# Patient Record
Sex: MALE | Race: BLACK OR AFRICAN AMERICAN | NOT HISPANIC OR LATINO | URBAN - METROPOLITAN AREA
[De-identification: names, ages, dates, MRNs, and addresses within clinical notes are randomized per-mention and may not be internally consistent; named-entity substitution may affect disease eponyms.]

---

## 2017-11-06 ENCOUNTER — OUTPATIENT (OUTPATIENT)
Dept: OUTPATIENT SERVICES | Facility: HOSPITAL | Age: 27
LOS: 1 days | Discharge: STILL ADMITTED OR ADMINISTRATIVE | End: 2017-11-06

## 2017-11-06 LAB
BASOPHILS # BLD AUTO: 0.03 K/UL — SIGNIFICANT CHANGE UP (ref 0–0.2)
BASOPHILS NFR BLD AUTO: 0.4 % — SIGNIFICANT CHANGE UP (ref 0–2)
EOSINOPHIL # BLD AUTO: 0.04 K/UL — SIGNIFICANT CHANGE UP (ref 0–0.5)
EOSINOPHIL NFR BLD AUTO: 0.5 % — SIGNIFICANT CHANGE UP (ref 0–6)
HCT VFR BLD CALC: 45.3 % — SIGNIFICANT CHANGE UP (ref 39–50)
HGB BLD-MCNC: 14.2 G/DL — SIGNIFICANT CHANGE UP (ref 13–17)
IMM GRANULOCYTES # BLD AUTO: 0.04 # — SIGNIFICANT CHANGE UP
IMM GRANULOCYTES NFR BLD AUTO: 0.5 % — SIGNIFICANT CHANGE UP (ref 0–1.5)
LYMPHOCYTES # BLD AUTO: 2.34 K/UL — SIGNIFICANT CHANGE UP (ref 1–3.3)
LYMPHOCYTES # BLD AUTO: 29.5 % — SIGNIFICANT CHANGE UP (ref 13–44)
MCHC RBC-ENTMCNC: 26.4 PG — LOW (ref 27–34)
MCHC RBC-ENTMCNC: 31.3 % — LOW (ref 32–36)
MCV RBC AUTO: 84.2 FL — SIGNIFICANT CHANGE UP (ref 80–100)
MONOCYTES # BLD AUTO: 0.81 K/UL — SIGNIFICANT CHANGE UP (ref 0–0.9)
MONOCYTES NFR BLD AUTO: 10.2 % — SIGNIFICANT CHANGE UP (ref 2–14)
NEUTROPHILS # BLD AUTO: 4.68 K/UL — SIGNIFICANT CHANGE UP (ref 1.8–7.4)
NEUTROPHILS NFR BLD AUTO: 58.9 % — SIGNIFICANT CHANGE UP (ref 43–77)
NRBC # FLD: 0 — SIGNIFICANT CHANGE UP
PLATELET # BLD AUTO: 226 K/UL — SIGNIFICANT CHANGE UP (ref 150–400)
PMV BLD: 11.9 FL — SIGNIFICANT CHANGE UP (ref 7–13)
RBC # BLD: 5.38 M/UL — SIGNIFICANT CHANGE UP (ref 4.2–5.8)
RBC # FLD: 14.9 % — HIGH (ref 10.3–14.5)
WBC # BLD: 7.94 K/UL — SIGNIFICANT CHANGE UP (ref 3.8–10.5)
WBC # FLD AUTO: 7.94 K/UL — SIGNIFICANT CHANGE UP (ref 3.8–10.5)

## 2017-11-07 DIAGNOSIS — F25.9 SCHIZOAFFECTIVE DISORDER, UNSPECIFIED: ICD-10-CM

## 2017-11-08 ENCOUNTER — OUTPATIENT (OUTPATIENT)
Dept: OUTPATIENT SERVICES | Facility: HOSPITAL | Age: 27
LOS: 1 days | Discharge: HOME | End: 2017-11-08
Payer: COMMERCIAL

## 2017-11-10 LAB — CLOZAPINE SERPL-MCNC: SIGNIFICANT CHANGE UP

## 2017-11-15 ENCOUNTER — TRANSCRIPTION ENCOUNTER (OUTPATIENT)
Age: 27
End: 2017-11-15

## 2017-12-01 ENCOUNTER — OUTPATIENT (OUTPATIENT)
Dept: OUTPATIENT SERVICES | Facility: HOSPITAL | Age: 27
LOS: 1 days | End: 2017-12-01
Payer: MEDICAID

## 2017-12-01 PROCEDURE — G9001: CPT

## 2017-12-18 ENCOUNTER — APPOINTMENT (OUTPATIENT)
Dept: NEUROLOGY | Facility: HOSPITAL | Age: 27
End: 2017-12-18

## 2017-12-18 ENCOUNTER — OUTPATIENT (OUTPATIENT)
Dept: OUTPATIENT SERVICES | Facility: HOSPITAL | Age: 27
LOS: 1 days | End: 2017-12-18

## 2017-12-18 VITALS
HEART RATE: 104 BPM | HEIGHT: 69 IN | DIASTOLIC BLOOD PRESSURE: 96 MMHG | WEIGHT: 167 LBS | BODY MASS INDEX: 24.73 KG/M2 | SYSTOLIC BLOOD PRESSURE: 128 MMHG

## 2017-12-18 DIAGNOSIS — F32.9 MAJOR DEPRESSIVE DISORDER, SINGLE EPISODE, UNSPECIFIED: ICD-10-CM

## 2017-12-18 DIAGNOSIS — F25.1 SCHIZOAFFECTIVE DISORDER, DEPRESSIVE TYPE: ICD-10-CM

## 2017-12-18 DIAGNOSIS — F39 UNSPECIFIED MOOD [AFFECTIVE] DISORDER: ICD-10-CM

## 2017-12-18 DIAGNOSIS — F20.1 DISORGANIZED SCHIZOPHRENIA: ICD-10-CM

## 2017-12-18 DIAGNOSIS — F20.9 SCHIZOPHRENIA, UNSPECIFIED: ICD-10-CM

## 2017-12-18 DIAGNOSIS — R44.3 HALLUCINATIONS, UNSPECIFIED: ICD-10-CM

## 2017-12-18 DIAGNOSIS — Z86.59 PERSONAL HISTORY OF OTHER MENTAL AND BEHAVIORAL DISORDERS: ICD-10-CM

## 2017-12-18 DIAGNOSIS — R45.851 MAJOR DEPRESSIVE DISORDER, SINGLE EPISODE, UNSPECIFIED: ICD-10-CM

## 2017-12-18 DIAGNOSIS — R56.9 UNSPECIFIED CONVULSIONS: ICD-10-CM

## 2017-12-18 DIAGNOSIS — Z78.9 OTHER SPECIFIED HEALTH STATUS: ICD-10-CM

## 2017-12-18 DIAGNOSIS — Z87.898 PERSONAL HISTORY OF OTHER SPECIFIED CONDITIONS: ICD-10-CM

## 2017-12-18 DIAGNOSIS — Z91.5 PERSONAL HISTORY OF SELF-HARM: ICD-10-CM

## 2017-12-18 RX ORDER — CLOZAPINE 100 MG/1
100 TABLET ORAL
Refills: 0 | Status: ACTIVE | COMMUNITY

## 2017-12-18 RX ORDER — GABAPENTIN 600 MG/1
600 TABLET, COATED ORAL TWICE DAILY
Refills: 0 | Status: ACTIVE | COMMUNITY

## 2017-12-29 DIAGNOSIS — R69 ILLNESS, UNSPECIFIED: ICD-10-CM

## 2018-01-09 DIAGNOSIS — F25.9 SCHIZOAFFECTIVE DISORDER, UNSPECIFIED: ICD-10-CM

## 2018-01-29 ENCOUNTER — INPATIENT (INPATIENT)
Facility: HOSPITAL | Age: 28
LOS: 2 days | Discharge: HOME | DRG: 101 | End: 2018-02-01
Attending: PSYCHIATRY & NEUROLOGY | Admitting: PSYCHIATRY & NEUROLOGY
Payer: COMMERCIAL

## 2018-01-29 VITALS
SYSTOLIC BLOOD PRESSURE: 122 MMHG | OXYGEN SATURATION: 100 % | DIASTOLIC BLOOD PRESSURE: 87 MMHG | TEMPERATURE: 98 F | HEART RATE: 112 BPM | RESPIRATION RATE: 16 BRPM

## 2018-01-29 DIAGNOSIS — G40.909 EPILEPSY, UNSPECIFIED, NOT INTRACTABLE, WITHOUT STATUS EPILEPTICUS: ICD-10-CM

## 2018-01-29 LAB
ALBUMIN SERPL ELPH-MCNC: 3.9 G/DL — SIGNIFICANT CHANGE UP (ref 3.3–5)
ALP SERPL-CCNC: 77 U/L — SIGNIFICANT CHANGE UP (ref 40–120)
ALT FLD-CCNC: 17 U/L — SIGNIFICANT CHANGE UP (ref 10–45)
ANION GAP SERPL CALC-SCNC: 11 MMOL/L — SIGNIFICANT CHANGE UP (ref 5–17)
AST SERPL-CCNC: 22 U/L — SIGNIFICANT CHANGE UP (ref 10–40)
BASOPHILS # BLD AUTO: 0.01 K/UL — SIGNIFICANT CHANGE UP (ref 0–0.2)
BASOPHILS NFR BLD AUTO: 0.1 % — SIGNIFICANT CHANGE UP (ref 0–2)
BILIRUB SERPL-MCNC: <0.2 MG/DL — SIGNIFICANT CHANGE UP (ref 0.2–1.2)
BUN SERPL-MCNC: 10 MG/DL — SIGNIFICANT CHANGE UP (ref 7–23)
CALCIUM SERPL-MCNC: 9 MG/DL — SIGNIFICANT CHANGE UP (ref 8.4–10.5)
CHLORIDE SERPL-SCNC: 104 MMOL/L — SIGNIFICANT CHANGE UP (ref 96–108)
CO2 SERPL-SCNC: 26 MMOL/L — SIGNIFICANT CHANGE UP (ref 22–31)
CREAT SERPL-MCNC: 1.02 MG/DL — SIGNIFICANT CHANGE UP (ref 0.5–1.3)
EOSINOPHIL # BLD AUTO: 0.04 K/UL — SIGNIFICANT CHANGE UP (ref 0–0.5)
EOSINOPHIL NFR BLD AUTO: 0.5 % — SIGNIFICANT CHANGE UP (ref 0–6)
GLUCOSE SERPL-MCNC: 84 MG/DL — SIGNIFICANT CHANGE UP (ref 70–99)
HCT VFR BLD CALC: 45.4 % — SIGNIFICANT CHANGE UP (ref 39–50)
HGB BLD-MCNC: 14.7 G/DL — SIGNIFICANT CHANGE UP (ref 13–17)
IMM GRANULOCYTES NFR BLD AUTO: 0.2 % — SIGNIFICANT CHANGE UP (ref 0–1.5)
LYMPHOCYTES # BLD AUTO: 2.27 K/UL — SIGNIFICANT CHANGE UP (ref 1–3.3)
LYMPHOCYTES # BLD AUTO: 27.3 % — SIGNIFICANT CHANGE UP (ref 13–44)
MCHC RBC-ENTMCNC: 27 PG — SIGNIFICANT CHANGE UP (ref 27–34)
MCHC RBC-ENTMCNC: 32.4 GM/DL — SIGNIFICANT CHANGE UP (ref 32–36)
MCV RBC AUTO: 83.3 FL — SIGNIFICANT CHANGE UP (ref 80–100)
MONOCYTES # BLD AUTO: 0.54 K/UL — SIGNIFICANT CHANGE UP (ref 0–0.9)
MONOCYTES NFR BLD AUTO: 6.5 % — SIGNIFICANT CHANGE UP (ref 2–14)
NEUTROPHILS # BLD AUTO: 5.45 K/UL — SIGNIFICANT CHANGE UP (ref 1.8–7.4)
NEUTROPHILS NFR BLD AUTO: 65.4 % — SIGNIFICANT CHANGE UP (ref 43–77)
PLATELET # BLD AUTO: 205 K/UL — SIGNIFICANT CHANGE UP (ref 150–400)
POTASSIUM SERPL-MCNC: 4.4 MMOL/L — SIGNIFICANT CHANGE UP (ref 3.5–5.3)
POTASSIUM SERPL-SCNC: 4.4 MMOL/L — SIGNIFICANT CHANGE UP (ref 3.5–5.3)
PROT SERPL-MCNC: 7.2 G/DL — SIGNIFICANT CHANGE UP (ref 6–8.3)
RBC # BLD: 5.45 M/UL — SIGNIFICANT CHANGE UP (ref 4.2–5.8)
RBC # FLD: 13.8 % — SIGNIFICANT CHANGE UP (ref 10.3–14.5)
SODIUM SERPL-SCNC: 141 MMOL/L — SIGNIFICANT CHANGE UP (ref 135–145)
VALPROATE SERPL-MCNC: 30 UG/ML — LOW (ref 50–100)
WBC # BLD: 8.33 K/UL — SIGNIFICANT CHANGE UP (ref 3.8–10.5)
WBC # FLD AUTO: 8.33 K/UL — SIGNIFICANT CHANGE UP (ref 3.8–10.5)

## 2018-01-29 PROCEDURE — 99223 1ST HOSP IP/OBS HIGH 75: CPT

## 2018-01-29 PROCEDURE — 95819 EEG AWAKE AND ASLEEP: CPT | Mod: 26

## 2018-01-29 RX ORDER — CLOZAPINE 150 MG/1
400 TABLET, ORALLY DISINTEGRATING ORAL AT BEDTIME
Qty: 0 | Refills: 0 | Status: DISCONTINUED | OUTPATIENT
Start: 2018-01-29 | End: 2018-02-01

## 2018-01-29 RX ORDER — GABAPENTIN 400 MG/1
600 CAPSULE ORAL THREE TIMES A DAY
Qty: 0 | Refills: 0 | Status: DISCONTINUED | OUTPATIENT
Start: 2018-01-29 | End: 2018-01-30

## 2018-01-29 RX ORDER — ENOXAPARIN SODIUM 100 MG/ML
40 INJECTION SUBCUTANEOUS EVERY 24 HOURS
Qty: 0 | Refills: 0 | Status: DISCONTINUED | OUTPATIENT
Start: 2018-01-29 | End: 2018-01-29

## 2018-01-29 RX ORDER — DIVALPROEX SODIUM 500 MG/1
500 TABLET, DELAYED RELEASE ORAL
Qty: 0 | Refills: 0 | Status: DISCONTINUED | OUTPATIENT
Start: 2018-01-29 | End: 2018-01-30

## 2018-01-29 RX ORDER — ENOXAPARIN SODIUM 100 MG/ML
40 INJECTION SUBCUTANEOUS EVERY 24 HOURS
Qty: 0 | Refills: 0 | Status: DISCONTINUED | OUTPATIENT
Start: 2018-01-29 | End: 2018-02-01

## 2018-01-29 RX ORDER — INFLUENZA VIRUS VACCINE 15; 15; 15; 15 UG/.5ML; UG/.5ML; UG/.5ML; UG/.5ML
0.5 SUSPENSION INTRAMUSCULAR ONCE
Qty: 0 | Refills: 0 | Status: DISCONTINUED | OUTPATIENT
Start: 2018-01-29 | End: 2018-02-01

## 2018-01-29 RX ADMIN — CLOZAPINE 400 MILLIGRAM(S): 150 TABLET, ORALLY DISINTEGRATING ORAL at 21:48

## 2018-01-29 RX ADMIN — ENOXAPARIN SODIUM 40 MILLIGRAM(S): 100 INJECTION SUBCUTANEOUS at 21:48

## 2018-01-29 RX ADMIN — GABAPENTIN 600 MILLIGRAM(S): 400 CAPSULE ORAL at 21:47

## 2018-01-29 RX ADMIN — DIVALPROEX SODIUM 500 MILLIGRAM(S): 500 TABLET, DELAYED RELEASE ORAL at 17:51

## 2018-01-29 RX ADMIN — GABAPENTIN 600 MILLIGRAM(S): 400 CAPSULE ORAL at 17:52

## 2018-01-29 NOTE — PATIENT PROFILE ADULT. - PRO MENTAL HEALTH SX RECENT
difficulty concentrating/feeling hopelessness/self-blame/attempt to harm/angry/feeling depressed/homicidal ideation/suicidal ideation

## 2018-01-29 NOTE — H&P ADULT - HISTORY OF PRESENT ILLNESS
26 y/o man with hx of depression, psychosis, hallucinations here on referral from his psychiatrist Dr. Lorna Mercado who would like him to get an EEG. Pt has had two prior seizures, 1st in 2011 and last 2 yrs ago which were in the setting of taking Clozapine. Pt does not remember these episodes in detail. Per mother he had an MRI and EEG at [facility] this summer , but does not have the results. They are unsure if it was a routine or prolonged study.     Pt reports depression, psychosis, disorganized thought, auditory and visual hallucinations of "very disturbing things". Also reports active suicidal ideation, has no plan currently. Has three prior suicide attempts in the past, last year was most recent, and were by wrist slitting and medication overdose. There are no weapons in the house and he has no access to any weapons. He current;y lives at home with his mother and attends a day programs during the week. He denies any headache, blurry vision, episodes of blanking out or limb shaking. He has no hx of head trauma or CNS infections. There may be some developmental delay. Pt attended undergraduate school but was unable to finish. 28 y/o man with hx of depression, psychosis, hallucinations here on referral from his psychiatrist Dr. Lorna Mercado who would like him to get an EEG. Pt has had two prior seizures, 1st in 2011 and last 2 yrs ago which were in the setting of taking Clozapine. Pt does not remember these episodes in detail. Per mother he had an MRI and EEG at [facility] this summer , but does not have the results. They are unsure if it was a routine or prolonged study.     Pt reports depression, psychosis, disorganized thought, auditory and visual hallucinations of "very disturbing things". Also reports active suicidal ideation, has no plan currently. Has three prior suicide attempts in the past, last year was most recent, and were by wrist slitting and medication overdose. There are no weapons in the house and he has no access to any weapons. He currently lives at home with his mother and attends a day programs during the week. He denies any headache, blurry vision, episodes of blanking out or limb shaking. He has no hx of head trauma or CNS infections. There may be some developmental delay. Pt attended undergraduate school but was unable to finish.   Visual hallucinations are consistent with seeing himself with facial injuries and scars. During the visit he states that he actively has visual hallucinations but not auditory. His auditory hallucinations are 3rd person most of the time, but some time the voices talk to him and command him. 28 y/o man with hx of depression, psychosis, hallucinations here on referral from his psychiatrist Dr. Lorna Mercado who would like him to get an EEG. Pt has had two prior seizures, 1st in 2011 and last 2 yrs ago which were in the setting of taking Clozapine. Pt does not remember these episodes in detail. Per mother he had an MRI and EEG at [facility] this summer , but does not have the results. They are unsure if it was a routine or prolonged study.     Pt reports depression, psychosis, disorganized thought, auditory and visual hallucinations of "very disturbing things". Also reports active suicidal ideation, has no plan currently. Has three prior suicide attempts in the past, last year was most recent, and were by wrist slitting and medication overdose. There are no weapons in the house and he has no access to any weapons. He currently lives at home with his mother and attends a day programs during the week. He denies any headache, blurry vision, episodes of blanking out or limb shaking. He has no hx of head trauma or CNS infections. There may be some developmental delay. Pt attended undergraduate school but was unable to finish.   Visual hallucinations are consistent with seeing himself with facial injuries and scars. During the visit he states that he actively has visual hallucinations but not auditory. His auditory hallucinations are 3rd person most of the time, but some time the voices talk to him and command him. He denies current suicidal ideation.

## 2018-01-29 NOTE — H&P ADULT - ATTENDING COMMENTS
28 yo male with long-standing history of schizoaffective disorder, possible personality disorder, followed at [facility] (Dr. Lorna Mercado), h/o suicide attempts (currently denying suicidal ideation), with ongoing visual and auditory hallucinations. Mr. Elliott had 2 reported likely GTCs during a hospitalization while Clozapine was increased to 600mg. Since then he was on AEDs; Initially Keppra, changed to VPA due to psych history, then Lamictal was added (stopped at some point for unclear reasons) and Gabapentin added (also unclear why). Per pt and his mom, no seizures since. He is admitted for monitoring if there is an underlying seizure disorder or seizures were provoked by increased dose clozapine.     Per discussion with Dr. Mercado today, the patient may not need to be on VPA and GBP for psychiatric reasons and it would be ok from an psychiatric standpoint to taper them off if there is no reason form him to take it from an epilepsy standpoint. We will taper GBP then VPA and monitor for epileptiform discharges and seizures, then decide if he will need to continue taking them.     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   EEG at [place] summer 2017 (not clear what kind, no results known).

## 2018-01-29 NOTE — H&P ADULT - NSHPPHYSICALEXAM_GEN_ALL_CORE
Neurological Exam:  Mental Status: Orientated to self, date and place.  Attention intact.  No dysarthria, aphasia or neglect.    Cranial Nerves: CN I - not tested.  PERRL, EOMI, VFF, no nystagmus or diplopia.  No APD. CN V1-3 intact to light touch and pinprick.      Motor:   Tone: normal.                  Strength:     [] Upper extremity                      Delt       Bicep    Tricep                                                  R         5/5 5/5 5/5 5/5                                               L          5/5 5/5 5/5 5/5  [] Lower extremity                       HF          KE          KF        DF         PF                                               R        5/5 5/5 5/5 5/5 5/5                                               L         5/5 5/5 5/5 5/5 5/5  Pronator drift: none                 No truncal ataxia.    Tremor: No resting, postural or action tremor.  No myoclonus.    Deep Tendon Reflexes: 1+ bilateral biceps, triceps, brachioradialis, knee and ankle  Toes flexor bilaterally    Gait: normal and stable.

## 2018-01-29 NOTE — H&P ADULT - NSHPREVIEWOFSYSTEMS_GEN_ALL_CORE
ROS:  HENT: No headache, no eye pain or discharge, no ear pain or discharge  Chest: No chest pain, no palpitation  Abdomen: No constipation, no diarrhea, no abdominal pain  EXT: No scars, no pain, no tenderness

## 2018-01-29 NOTE — H&P ADULT - ASSESSMENT
28 yo aam, with PMHx of schizophrenia,  pericranii venous anomaly for which he need MRI every 6 mths, last one done in jan 2013, which was stable, personality disorder from when he was 18, on clozapine,  presented to be monitored for seizure activity. He has 2 episodes of seizure at [facility] when they were increasing the Clozapine dose, no seizures recently. Seizures were described as GTCS (Entire body shaking, LOC, no head turn or forced gaze mentioned in the notes, postictal +, happened in the hospital and family wasn't there at that moment) . His schizophrenia symptoms are consistent with Visual hallucinations are consistent with seeing himself with facial injuries and scars. During the visit he states that he actively has visual hallucinations but not auditory. His auditory hallucinations are 3rd person most of the time, but some time the voices talk to him and command him.  Home meds: Gabapentin 600 TID, Clozapine 400 daily, Divalproex 500 BID  Plan:  [] C/W Clozapine  [] CBC, Diff, VPA level  [] Will D/W epileptologist regarding to AEDs  [] Seizure precautions  [] 2 mg Ativan for seizures > 3 mins, >2seizrues in 1 hour and > 3 seizures in 24h 28 yo aam, with PMHx of schizophrenia,  pericranii venous anomaly for which he need MRI every 6 mths, last one done in jan 2013, which was stable, personality disorder from when he was 18, on clozapine,  presented to be monitored for seizure activity. He has 2 episodes of seizure at [facility] when they were increasing the Clozapine dose, no seizures recently. Seizures were described as GTCS (Entire body shaking, LOC, no head turn or forced gaze mentioned in the notes, postictal +, happened in the hospital and family wasn't there at that moment) . His schizophrenia symptoms are consistent with Visual hallucinations are consistent with seeing himself with facial injuries and scars. During the visit he states that he actively has visual hallucinations but not auditory. His auditory hallucinations are 3rd person most of the time, but some time the voices talk to him and command him.  Home meds: Gabapentin 600 TID, Clozapine 400 daily, Divalproex 500 BID (denies taking Klonopin)  Plan:  [] C/W Clozapine  [] CBC, Diff, VPA level  [] Seizure precautions  [] 2 mg Ativan for seizures > 3 mins, >2seizrues in 1 hour and > 3 seizures in 24h

## 2018-01-30 ENCOUNTER — MOBILE ON CALL (OUTPATIENT)
Age: 28
End: 2018-01-30

## 2018-01-30 LAB
BASOPHILS # BLD AUTO: 0.01 K/UL — SIGNIFICANT CHANGE UP (ref 0–0.2)
BASOPHILS NFR BLD AUTO: 0.1 % — SIGNIFICANT CHANGE UP (ref 0–2)
EOSINOPHIL # BLD AUTO: 0.12 K/UL — SIGNIFICANT CHANGE UP (ref 0–0.5)
EOSINOPHIL NFR BLD AUTO: 1.5 % — SIGNIFICANT CHANGE UP (ref 0–6)
HCT VFR BLD CALC: 44.2 % — SIGNIFICANT CHANGE UP (ref 39–50)
HGB BLD-MCNC: 14.6 G/DL — SIGNIFICANT CHANGE UP (ref 13–17)
IMM GRANULOCYTES NFR BLD AUTO: 0.3 % — SIGNIFICANT CHANGE UP (ref 0–1.5)
LYMPHOCYTES # BLD AUTO: 3.42 K/UL — HIGH (ref 1–3.3)
LYMPHOCYTES # BLD AUTO: 43.7 % — SIGNIFICANT CHANGE UP (ref 13–44)
MCHC RBC-ENTMCNC: 27.4 PG — SIGNIFICANT CHANGE UP (ref 27–34)
MCHC RBC-ENTMCNC: 33 GM/DL — SIGNIFICANT CHANGE UP (ref 32–36)
MCV RBC AUTO: 83.1 FL — SIGNIFICANT CHANGE UP (ref 80–100)
MONOCYTES # BLD AUTO: 0.75 K/UL — SIGNIFICANT CHANGE UP (ref 0–0.9)
MONOCYTES NFR BLD AUTO: 9.6 % — SIGNIFICANT CHANGE UP (ref 2–14)
NEUTROPHILS # BLD AUTO: 3.51 K/UL — SIGNIFICANT CHANGE UP (ref 1.8–7.4)
NEUTROPHILS NFR BLD AUTO: 44.8 % — SIGNIFICANT CHANGE UP (ref 43–77)
PLATELET # BLD AUTO: 218 K/UL — SIGNIFICANT CHANGE UP (ref 150–400)
RBC # BLD: 5.32 M/UL — SIGNIFICANT CHANGE UP (ref 4.2–5.8)
RBC # FLD: 14 % — SIGNIFICANT CHANGE UP (ref 10.3–14.5)
WBC # BLD: 7.83 K/UL — SIGNIFICANT CHANGE UP (ref 3.8–10.5)
WBC # FLD AUTO: 7.83 K/UL — SIGNIFICANT CHANGE UP (ref 3.8–10.5)

## 2018-01-30 PROCEDURE — 99232 SBSQ HOSP IP/OBS MODERATE 35: CPT

## 2018-01-30 PROCEDURE — 95951: CPT | Mod: 26

## 2018-01-30 RX ORDER — DIVALPROEX SODIUM 500 MG/1
250 TABLET, DELAYED RELEASE ORAL
Qty: 0 | Refills: 0 | Status: DISCONTINUED | OUTPATIENT
Start: 2018-01-31 | End: 2018-01-31

## 2018-01-30 RX ORDER — DIVALPROEX SODIUM 500 MG/1
500 TABLET, DELAYED RELEASE ORAL ONCE
Qty: 0 | Refills: 0 | Status: COMPLETED | OUTPATIENT
Start: 2018-01-30 | End: 2018-01-30

## 2018-01-30 RX ORDER — GABAPENTIN 400 MG/1
300 CAPSULE ORAL THREE TIMES A DAY
Qty: 0 | Refills: 0 | Status: DISCONTINUED | OUTPATIENT
Start: 2018-01-30 | End: 2018-01-30

## 2018-01-30 RX ORDER — GABAPENTIN 400 MG/1
300 CAPSULE ORAL ONCE
Qty: 0 | Refills: 0 | Status: DISCONTINUED | OUTPATIENT
Start: 2018-01-30 | End: 2018-01-30

## 2018-01-30 RX ADMIN — DIVALPROEX SODIUM 500 MILLIGRAM(S): 500 TABLET, DELAYED RELEASE ORAL at 16:59

## 2018-01-30 RX ADMIN — CLOZAPINE 400 MILLIGRAM(S): 150 TABLET, ORALLY DISINTEGRATING ORAL at 21:16

## 2018-01-30 RX ADMIN — DIVALPROEX SODIUM 500 MILLIGRAM(S): 500 TABLET, DELAYED RELEASE ORAL at 05:10

## 2018-01-30 RX ADMIN — GABAPENTIN 600 MILLIGRAM(S): 400 CAPSULE ORAL at 05:10

## 2018-01-30 RX ADMIN — ENOXAPARIN SODIUM 40 MILLIGRAM(S): 100 INJECTION SUBCUTANEOUS at 21:16

## 2018-01-30 NOTE — PROGRESS NOTE ADULT - SUBJECTIVE AND OBJECTIVE BOX
NEUROLOGY FOLLOW UP NOTE     Patient is a 27y old  Male who presents with a chief complaint of seizure monitoring     SUBJECTIVE, INTERVAL HISTORY: No events overnight, states he feels depressed     OBJECTIVE:   Vital Signs Last 24 Hrs  T(C): 36.6 (30 Jan 2018 07:39), Max: 37 (29 Jan 2018 23:23)  T(F): 97.8 (30 Jan 2018 07:39), Max: 98.6 (29 Jan 2018 23:23)  HR: 102 (30 Jan 2018 07:39) (98 - 112)  BP: 109/78 (30 Jan 2018 07:39) (109/78 - 122/87)  BP(mean): --  RR: 19 (30 Jan 2018 07:39) (16 - 19)  SpO2: 96% (30 Jan 2018 07:39) (96% - 100%)    01-29    141  |  104  |  10  ----------------------------<  84  4.4   |  26  |  1.02    Ca    9.0      29 Jan 2018 15:54    TPro  7.2  /  Alb  3.9  /  TBili  <0.2  /  DBili  x   /  AST  22  /  ALT  17  /  AlkPhos  77  01-29                          14.6   7.83  )-----------( 218      ( 30 Jan 2018 07:42 )             44.2       MEDICATIONS  (STANDING):  cloZAPine 400 milliGRAM(s) Oral at bedtime  diVALproex  milliGRAM(s) Oral two times a day  enoxaparin Injectable 40 milliGRAM(s) SubCutaneous every 24 hours  gabapentin 300 milliGRAM(s) Oral once  influenza   Vaccine 0.5 milliLiter(s) IntraMuscular once      GENERAL EXAM: No acute distress                   NEUROLOGICAL EXAM:  Mental Status: AAOx3, fluent speech, follows simple commands, able to name  Cranial Nerves: EOMI, PERRL, VFF, V1-V3 intact, facial symmetric, tongue midline  Motor: strength 5/5 throughout. No drift x4. Asterixis b/l UE   Sensation: Intact to LT throughout  Coordination: FTN intact b/l    RADIOLOGY:

## 2018-01-30 NOTE — PROGRESS NOTE ADULT - ASSESSMENT
26 yo M w/ significant psychiatric history, venous anomaly pericranially w/ stable MRIs presents for EMU monitoring. Had two prior episodes of seizures, described as GTCs.     Home meds: Gabapentin 600 TID, Clozapine 400 daily, Divalproex 500 BID      Plan:  [] C/W Clozapine, c/w Depakote given possible mood stabilizer   [] will d/w Psychiatry re Depakote and whether it was added for mood stabilizer or AEDs   []will give one dose of Gabapentin 300 mg in evening, then stop   [] Depakote level low (30)   [] Seizure precautions  [] c/w 1:1 observation given prior suicidal history and active thoughts of depression and self harm, although no plan 28 yo M w/ significant psychiatric history, venous anomaly pericranially w/ stable MRIs presents for EMU monitoring. Had two prior episodes of seizures, described as GTCs.     Home meds: Gabapentin 600 TID, Clozapine 400 daily, Divalproex 500 BID      Plan:  -C/W Clozapine   - d/w Psychiatry re Depakote for mood stabilizer, they are agreeable to discontinue this medication as he does not require it for psychiatric purposes   - will taper Depakote: will give 500 mg tonight, then 250 mg tomorrow x2 doses, then discontinue   - will discontinue Gabapentin   -Depakote level low (30)   -Seizure precautions  -c/w 1:1 observation given prior suicidal history and active thoughts of depression and self harm, although no plan

## 2018-01-31 ENCOUNTER — TRANSCRIPTION ENCOUNTER (OUTPATIENT)
Age: 28
End: 2018-01-31

## 2018-01-31 VITALS
RESPIRATION RATE: 18 BRPM | SYSTOLIC BLOOD PRESSURE: 130 MMHG | HEART RATE: 118 BPM | DIASTOLIC BLOOD PRESSURE: 88 MMHG | TEMPERATURE: 98 F | OXYGEN SATURATION: 97 %

## 2018-01-31 DIAGNOSIS — F25.1 SCHIZOAFFECTIVE DISORDER, DEPRESSIVE TYPE: ICD-10-CM

## 2018-01-31 PROCEDURE — 99233 SBSQ HOSP IP/OBS HIGH 50: CPT

## 2018-01-31 PROCEDURE — 95951: CPT | Mod: 26

## 2018-01-31 PROCEDURE — 99254 IP/OBS CNSLTJ NEW/EST MOD 60: CPT | Mod: GC

## 2018-01-31 RX ORDER — DIVALPROEX SODIUM 500 MG/1
1 TABLET, DELAYED RELEASE ORAL
Qty: 0 | Refills: 0 | COMMUNITY
Start: 2018-01-31

## 2018-01-31 RX ORDER — GABAPENTIN 400 MG/1
1 CAPSULE ORAL
Qty: 0 | Refills: 0 | COMMUNITY

## 2018-01-31 RX ORDER — DIVALPROEX SODIUM 500 MG/1
500 TABLET, DELAYED RELEASE ORAL DAILY
Qty: 0 | Refills: 0 | Status: DISCONTINUED | OUTPATIENT
Start: 2018-01-31 | End: 2018-02-01

## 2018-01-31 RX ORDER — DIVALPROEX SODIUM 500 MG/1
250 TABLET, DELAYED RELEASE ORAL ONCE
Qty: 0 | Refills: 0 | Status: COMPLETED | OUTPATIENT
Start: 2018-01-31 | End: 2018-01-31

## 2018-01-31 RX ADMIN — DIVALPROEX SODIUM 250 MILLIGRAM(S): 500 TABLET, DELAYED RELEASE ORAL at 05:03

## 2018-01-31 RX ADMIN — DIVALPROEX SODIUM 500 MILLIGRAM(S): 500 TABLET, DELAYED RELEASE ORAL at 17:19

## 2018-01-31 RX ADMIN — DIVALPROEX SODIUM 250 MILLIGRAM(S): 500 TABLET, DELAYED RELEASE ORAL at 11:54

## 2018-01-31 NOTE — DISCHARGE NOTE ADULT - PATIENT PORTAL LINK FT
“You can access the Follow[employer] Patient Portal, offered by [facility], by registering with the following website: [email or web address]”

## 2018-01-31 NOTE — BEHAVIORAL HEALTH ASSESSMENT NOTE - NSBHCHARTREVIEWLAB_PSY_A_CORE FT
01-29    141  |  104  |  10  ----------------------------<  84  4.4   |  26  |  1.02    Ca    9.0      29 Jan 2018 15:54    TPro  7.2  /  Alb  3.9  /  TBili  <0.2  /  DBili  x   /  AST  22  /  ALT  17  /  AlkPhos  77  01-29  LIVER FUNCTIONS - ( 29 Jan 2018 15:54 )  Alb: 3.9 g/dL / Pro: 7.2 g/dL / ALK PHOS: 77 U/L / ALT: 17 U/L / AST: 22 U/L / GGT: x         CBC Full  -  ( 30 Jan 2018 07:42 )  WBC Count : 7.83 K/uL  Hemoglobin : 14.6 g/dL  Hematocrit : 44.2 %  Platelet Count - Automated : 218 K/uL  Mean Cell Volume : 83.1 fl  Mean Cell Hemoglobin : 27.4 pg  Mean Cell Hemoglobin Concentration : 33.0 gm/dL  Auto Neutrophil # : 3.51 K/uL  Auto Lymphocyte # : 3.42 K/uL  Auto Monocyte # : 0.75 K/uL  Auto Eosinophil # : 0.12 K/uL  Auto Basophil # : 0.01 K/uL  Auto Neutrophil % : 44.8 %  Auto Lymphocyte % : 43.7 %  Auto Monocyte % : 9.6 %  Auto Eosinophil % : 1.5 %  Auto Basophil % : 0.1 %

## 2018-01-31 NOTE — BEHAVIORAL HEALTH ASSESSMENT NOTE - HPI (INCLUDE ILLNESS QUALITY, SEVERITY, DURATION, TIMING, CONTEXT, MODIFYING FACTORS, ASSOCIATED SIGNS AND SYMPTOMS)
Patient is a 28 y/o Male, unemployed, single, domiciled with mother, with PMHx of Seizure 2/2 Clozaril, and PPHx of Schizoaffective disorder on Clozaril and Depakote, s/p ECT X22 treatments with little benefit, multiple prior psychiatric hospitalizations, last admission 1/16, history of passive suicidal ideations, distant history of self cutting behavior, no history of suicide attempts although pt reports overdosing on "some pills" which was not confirmed by mother or medical records, admitted for routine monitoring if there is an underlying seizure disorder or seizures were provoked by increased dose clozapine. Psychiatry consulted for suicidal ideations.    Pt found laying in bed, made poor eye contact, engaged in interview. When asked how he was doing, pt responded by stating "I don't want to live, these are passive thoughts, I don't have a plan." He reports passive SI for approximately 4 years, reports cutting himself and once taking an overdose but remains vague about this, unable to recall the medications taken, unable to recall how he was treated. 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 He denies feeling hopeless or helpless, denied other endogenous symptoms associated with depression. He denied feeling anxious, denies panic attacks, denies manic symptoms including but not limited to insomnia, distractibility, racing thoughts, denies history of abuse or trauma. He denies history of substance abuse.    Collateral history obtained from pt's out pt psychiatrist Dr. Mercado who reports pt is suicidal at baseline, with no plans or intentions. He also has visual hallucinations and auditory hallucinations at baseline. He is engaged in weekly group therapy and [facility] day program. He is currently being evaluated for autism spectrum disorder. Patient is a 26 y/o Male, unemployed, single, domiciled with mother, with PMHx of Seizure 2/2 Clozaril, and PPHx of Schizoaffective disorder on Clozaril and Depakote, s/p ECT X22 treatments with little benefit, multiple prior psychiatric hospitalizations, last admission 1/16, history of passive suicidal ideations, distant history of self cutting behavior, no history of suicide attempts although pt reports overdosing on "some pills" which was not confirmed by mother or medical records, admitted for routine monitoring if there is an underlying seizure disorder or seizures were provoked by increased dose clozapine. Psychiatry consulted for suicidal ideations.    Pt found laying in bed, made poor eye contact, engaged in interview. When asked how he was doing, pt responded by stating "I don't want to live, these are passive thoughts, I don't have a plan." He reports passive SI for approximately 4 years, reports cutting himself and once taking an overdose but remains vague about this, unable to recall the medications taken, unable to recall how he was treated. He also reports current active visual hallucinations, of "playing out certain things," such as "horror movies" and "cyborgs." He denied active AH, but does report having this approximately 1 y/a, non-command type, of a woman who asked him to like her. 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 He denies feeling hopeless or helpless, denied other endogenous symptoms associated with depression. He denied feeling anxious, denies panic attacks, denies manic symptoms including but not limited to insomnia, distractibility, racing thoughts, denies history of abuse or trauma. He denies history of substance abuse.    Collateral history obtained from pt's out pt psychiatrist Dr. Mercado who reports pt is suicidal at baseline, with no plans or intentions. He also has visual hallucinations and auditory hallucinations at baseline. He is engaged in weekly group therapy and [facility] day program. He is currently being evaluated for autism spectrum disorder.

## 2018-01-31 NOTE — BEHAVIORAL HEALTH ASSESSMENT NOTE - NSBHSUICPROTECTFACT_PSY_A_CORE
Identifies reasons for living/Supportive social network or family/Future oriented/Positive therapeutic relationships/Engaged in work or school

## 2018-01-31 NOTE — BEHAVIORAL HEALTH ASSESSMENT NOTE - NSBHCHARTREVIEWIMAGING_PSY_A_CORE FT
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 Otherwise no acute intracranial   abnormality.    Brain MRV: Multiple scalp veins draining into the superior sagittal sinus   via emissary vein compatible with sinus pericanii.  Vein of Mayo is not seen. 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    NICK NAYAK M.D., RADIOLOGY FELLOW  This document has been electronically signed.  ALBA HA M.D., ATTENDING RADIOLOGIST  This document has been electronicallysigned. Jan 29 2016  9:54A

## 2018-01-31 NOTE — BEHAVIORAL HEALTH ASSESSMENT NOTE - CASE SUMMARY
This is a 27-y.o. AAM pt. with PMHx of Seizure 2/2 Clozaril, and PPHx of Schizoaffective disorder on Clozaril and Depakote, s/p ECT X22 treatments with little benefit, multiple prior psychiatric hospitalizations (last in 2016), history of passive suicidal ideations, distant history of self cutting behavior, no history of suicide attempts although pt reports overdosing on "some pills", admitted for routine monitoring if there is an underlying seizure disorder or seizures were provoked by increased dose clozapine. Psychiatry consulted for suicidal ideations. Patient at his likely baseline.    I have seen and evaluated this patient myself. Chart, labs, meds reviewed. I agree with fellow's assessment and plan.

## 2018-01-31 NOTE — BEHAVIORAL HEALTH ASSESSMENT NOTE - NSBHCHARTREVIEWVS_PSY_A_CORE FT
Vital Signs Last 24 Hrs  T(C): 36.8 (31 Jan 2018 12:57), Max: 36.8 (30 Jan 2018 19:21)  T(F): 98.2 (31 Jan 2018 12:57), Max: 98.2 (30 Jan 2018 19:21)  HR: 104 (31 Jan 2018 12:57) (100 - 118)  BP: 122/85 (31 Jan 2018 12:57) (110/70 - 122/85)  BP(mean): --  RR: 20 (31 Jan 2018 12:57) (17 - 20)  SpO2: 96% (31 Jan 2018 12:57) (96% - 98%)

## 2018-01-31 NOTE — BEHAVIORAL HEALTH ASSESSMENT NOTE - NSBHCHARTREVIEWINVESTIGATE_PSY_A_CORE FT
1/22/18    Ventricular Rate 90 BPM    Atrial Rate 90 BPM    P-R Interval 152 ms    QRS Duration 90 ms     ms    QTc 420 ms    P Axis 57 degrees    R Axis 58 degrees    T Axis 37 degrees    Diagnosis Line Normal sinus rhythm  Normal ECG

## 2018-01-31 NOTE — DISCHARGE NOTE ADULT - PLAN OF CARE
monitoring continue taking Depakote 500 mg bid   stop taking Gabapentin 600 mg bid   continue taking rest of medications as per Psychiatrist please follow up with your Psychiatrist Dr Lorna Mercado on 2/21 at 1:30 PM at [facility]

## 2018-01-31 NOTE — PROGRESS NOTE ADULT - SUBJECTIVE AND OBJECTIVE BOX
NEUROLOGY FOLLOW UP NOTE     Patient is a 27y old  Male who presents with a chief complaint of seizure monitoring     SUBJECTIVE, INTERVAL HISTORY: No events overnight, states he feels depressed; says feels suicidal (no plan)    Vital Signs Last 24 Hrs  T(C): 36.4 (31 Jan 2018 09:18), Max: 36.8 (30 Jan 2018 19:21)  T(F): 97.6 (31 Jan 2018 09:18), Max: 98.2 (30 Jan 2018 19:21)  HR: 100 (31 Jan 2018 09:18) (100 - 118)  BP: 114/81 (31 Jan 2018 09:18) (110/70 - 122/85)  BP(mean): --  RR: 17 (31 Jan 2018 09:18) (17 - 18)  SpO2: 98% (31 Jan 2018 09:18) (96% - 98%)    MEDICATIONS  (STANDING):  cloZAPine 400 milliGRAM(s) Oral at bedtime  diVALproex  milliGRAM(s) Oral daily  enoxaparin Injectable 40 milliGRAM(s) SubCutaneous every 24 hours  influenza   Vaccine 0.5 milliLiter(s) IntraMuscular once    GENERAL EXAM: No acute distress                   NEUROLOGICAL EXAM:  Mental Status: AAOx3, fluent speech, follows simple commands, able to name  Cranial Nerves: EOMI, PERRL, VFF, V1-V3 intact, facial symmetric, tongue midline  Motor: strength 5/5 throughout. No drift x4. Asterixis b/l UE   Sensation: Intact to LT throughout  Coordination: FTN intact b/l    RADIOLOGY:

## 2018-01-31 NOTE — BEHAVIORAL HEALTH ASSESSMENT NOTE - PAST PSYCHOTROPIC MEDICATION
Saphris, Risperdal, Pristiq, Abilify, Haldol, Zyprexa, Prolixin, Haldol, Litihum, Lamictal, Celexa, Cymbalta, Wellbutrin XL, Ativan, Zolpidem, Cytomel, Atenolol

## 2018-01-31 NOTE — DISCHARGE NOTE ADULT - MEDICATION SUMMARY - MEDICATIONS TO TAKE
I will START or STAY ON the medications listed below when I get home from the hospital:    divalproex sodium 500 mg oral delayed release tablet  -- 1 tab(s) by mouth 2 times a day  -- Indication: For Seizures    cloZAPine 50 mg oral tablet  -- 5 tab(s) by mouth once a day (at bedtime)  -- Indication: For Psychiatric Disorder

## 2018-01-31 NOTE — BEHAVIORAL HEALTH ASSESSMENT NOTE - OTHER PAST PSYCHIATRIC HISTORY (INCLUDE DETAILS REGARDING ONSET, COURSE OF ILLNESS, INPATIENT/OUTPATIENT TREATMENT)
Pt was diagnosed with Schizoaffective d/o since age 16, previously dx with schizoid personality disorder, hospitalized at [facility] in 2014 and was transferred to [facility] in Feb.2014. He followed up with [facility], attended [facility], attended day program at [facility]. He has a hx of ECT X22 treatments with minimal benefit.

## 2018-01-31 NOTE — DISCHARGE NOTE ADULT - HOSPITAL COURSE
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 He had 2 episodes of seizure at [facility] when they were increasing the Clozapine dose, no seizures recently. Seizures were described as GTCS (Entire body shaking, LOC, no head turn or forced gaze mentioned in the notes, postictal +, happened in the hospital and family wasn't there at that moment).     Patient was admitted for EMU monitoring, where his vEEG was unremarkable w/ no epileptiform activity or sharp spikes or wave discharges. Psychiatry evaluated patient for active suicidal ideation, recommended //////. Patient will be discontinued on Gabapentin and will continue taking Depakote 500 mg bid and f/u with his Psychiatrist and PMD. 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 He had 2 episodes of seizure at [facility] when they were increasing the Clozapine dose, no seizures recently. Seizures were described as GTCS (Entire body shaking, LOC, no head turn or forced gaze mentioned in the notes, postictal +, happened in the hospital and family wasn't there at that moment).     Patient was admitted for EMU monitoring, where his vEEG was unremarkable w/ no epileptiform activity or sharp spikes or wave discharges. Psychiatry evaluated patient for active suicidal ideation, recommended outpatient follow up as patient is at his baseline. Patient will be discontinued on Gabapentin and will continue taking Depakote 500 mg bid and f/u with his Psychiatrist and PMD.     Patient will f/u with his Psychiatrist Dr Lorna Mercado at [facility] on 2/21 at 1:30 PM, he is cleared to return to his day program and group therapy.

## 2018-01-31 NOTE — PROGRESS NOTE ADULT - ASSESSMENT
26 yo M w/ significant psychiatric history, venous anomaly pericranially w/ stable MRIs presents for EMU monitoring. Had two prior episodes of seizures, described as GTCs.     Home meds: Gabapentin 600 TID, Clozapine 400 daily, Divalproex 500 BID      Plan:  -C/W Clozapine   - d/w Psychiatry re Depakote for mood stabilizer, they are agreeable to discontinue this medication as he does not require it for psychiatric purposes   - However, will continue VPA per Dr. Allison for possible history of seizures (ok with lower level per Dr. Allison)  - will discontinue Gabapentin   -Seizure precautions  -c/w 1:1 observation given prior suicidal history and active thoughts of depression and self harm, although no plan ; psyc consult

## 2018-01-31 NOTE — DISCHARGE NOTE ADULT - MEDICATION SUMMARY - MEDICATIONS TO STOP TAKING
I will STOP taking the medications listed below when I get home from the hospital:    KlonoPIN 0.5 mg oral tablet  -- 1 tab(s) by mouth 2 times a day    diphenhydrAMINE 50 mg oral capsule  -- 1 cap(s) by mouth every 6 hours, As needed, anxiety/insomnia/EPS PPX    gabapentin 600 mg oral tablet  -- 1 tab(s) by mouth 3 times a day

## 2018-01-31 NOTE — DISCHARGE NOTE ADULT - CARE PLAN
Principal Discharge DX:	Seizure  Goal:	monitoring  Assessment and plan of treatment:	continue taking Depakote 500 mg bid   stop taking Gabapentin 600 mg bid   continue taking rest of medications as per Psychiatrist Principal Discharge DX:	Seizure  Goal:	monitoring  Assessment and plan of treatment:	continue taking Depakote 500 mg bid   stop taking Gabapentin 600 mg bid   continue taking rest of medications as per Psychiatrist  Secondary Diagnosis:	Schizo affective schizophrenia  Assessment and plan of treatment:	please follow up with your Psychiatrist Dr Lorna Mercado on 2/21 at 1:30 PM at [facility]

## 2018-01-31 NOTE — BEHAVIORAL HEALTH ASSESSMENT NOTE - NSBHVIOLPROTECT_PSY_A_CORE
Residential stability/Relationship stability/Engagement in treatment/Good treatment reponse/ compliance/Sobriety

## 2018-01-31 NOTE — BEHAVIORAL HEALTH ASSESSMENT NOTE - SUMMARY
Patient is a 26 y/o Male, unemployed, single, domiciled with mother, with PMHx of Seizure 2/2 Clozaril, and PPHx of Schizoaffective disorder on Clozaril and Depakote, s/p ECT X22 treatments with little benefit, multiple prior psychiatric hospitalizations, last admission 1/16, history of passive suicidal ideations, distant history of self cutting behavior, no history of suicide attempts although pt reports overdosing on "some pills" which was not confirmed by mother or medical records, admitted for routine monitoring if there is an underlying seizure disorder or seizures were provoked by increased dose clozapine. Psychiatry consulted for suicidal ideations.    Patient presents with passive SI, with no plans or intent. Also reports visual and auditory hallucinations, all of which are chronic and present at baseline, as confirmed by out pt psychiatrist. Pt is not an acute risk to self or others and does not meet criteria for in-pt psychiatric hospitalization. He also does not require 1:1 observation and can be discharged with follow up with [facility] out pt, where he is engaged in weekly group therapy, monthly psychiatric follow up and [facility] day program.

## 2018-02-01 PROCEDURE — 95819 EEG AWAKE AND ASLEEP: CPT

## 2018-02-01 PROCEDURE — 85027 COMPLETE CBC AUTOMATED: CPT

## 2018-02-01 PROCEDURE — 80164 ASSAY DIPROPYLACETIC ACD TOT: CPT

## 2018-02-01 PROCEDURE — 95951: CPT

## 2018-02-01 PROCEDURE — 80053 COMPREHEN METABOLIC PANEL: CPT

## 2018-03-26 ENCOUNTER — OUTPATIENT (OUTPATIENT)
Dept: OUTPATIENT SERVICES | Facility: HOSPITAL | Age: 28
LOS: 1 days | End: 2018-03-26

## 2018-03-26 ENCOUNTER — APPOINTMENT (OUTPATIENT)
Dept: NEUROLOGY | Facility: HOSPITAL | Age: 28
End: 2018-03-26

## 2018-03-26 VITALS
SYSTOLIC BLOOD PRESSURE: 134 MMHG | BODY MASS INDEX: 24.88 KG/M2 | DIASTOLIC BLOOD PRESSURE: 85 MMHG | WEIGHT: 168 LBS | HEIGHT: 69 IN | HEART RATE: 96 BPM

## 2018-03-26 DIAGNOSIS — R56.9 UNSPECIFIED CONVULSIONS: ICD-10-CM

## 2018-03-26 RX ORDER — DIVALPROEX SODIUM 500 MG/1
500 TABLET, DELAYED RELEASE ORAL
Qty: 60 | Refills: 5 | Status: ACTIVE | COMMUNITY

## 2018-03-28 DIAGNOSIS — R56.9 UNSPECIFIED CONVULSIONS: ICD-10-CM

## 2018-04-01 ENCOUNTER — OUTPATIENT (OUTPATIENT)
Dept: OUTPATIENT SERVICES | Facility: HOSPITAL | Age: 28
LOS: 1 days | End: 2018-04-01
Payer: MEDICAID

## 2018-04-10 DIAGNOSIS — R69 ILLNESS, UNSPECIFIED: ICD-10-CM

## 2018-06-01 PROCEDURE — G9001: CPT

## 2018-06-01 PROCEDURE — G9005: CPT

## 2018-07-01 ENCOUNTER — OUTPATIENT (OUTPATIENT)
Dept: OUTPATIENT SERVICES | Facility: HOSPITAL | Age: 28
LOS: 1 days | End: 2018-07-01
Payer: COMMERCIAL

## 2018-07-01 DIAGNOSIS — Z76.89 PERSONS ENCOUNTERING HEALTH SERVICES IN OTHER SPECIFIED CIRCUMSTANCES: ICD-10-CM

## 2018-07-10 DIAGNOSIS — Z71.89 OTHER SPECIFIED COUNSELING: ICD-10-CM

## 2019-04-12 ENCOUNTER — INPATIENT (INPATIENT)
Facility: HOSPITAL | Age: 29
LOS: 12 days | Discharge: HOME | End: 2019-04-25
Attending: PSYCHIATRY & NEUROLOGY | Admitting: PSYCHIATRY & NEUROLOGY
Payer: MEDICAID

## 2019-04-12 VITALS — WEIGHT: 198.42 LBS | RESPIRATION RATE: 17 BRPM | HEIGHT: 69 IN | TEMPERATURE: 98 F

## 2019-04-12 DIAGNOSIS — F25.0 SCHIZOAFFECTIVE DISORDER, BIPOLAR TYPE: ICD-10-CM

## 2019-04-12 LAB
ALBUMIN SERPL ELPH-MCNC: 4.1 G/DL — SIGNIFICANT CHANGE UP (ref 3.3–5)
ALP SERPL-CCNC: 74 U/L — SIGNIFICANT CHANGE UP (ref 40–120)
ALT FLD-CCNC: 17 U/L — SIGNIFICANT CHANGE UP (ref 4–41)
ANION GAP SERPL CALC-SCNC: 10 MMO/L — SIGNIFICANT CHANGE UP (ref 7–14)
AST SERPL-CCNC: 14 U/L — SIGNIFICANT CHANGE UP (ref 4–40)
BASOPHILS # BLD AUTO: 0.01 K/UL — SIGNIFICANT CHANGE UP (ref 0–0.2)
BASOPHILS NFR BLD AUTO: 0.2 % — SIGNIFICANT CHANGE UP (ref 0–2)
BILIRUB SERPL-MCNC: 0.2 MG/DL — SIGNIFICANT CHANGE UP (ref 0.2–1.2)
BUN SERPL-MCNC: 10 MG/DL — SIGNIFICANT CHANGE UP (ref 7–23)
CALCIUM SERPL-MCNC: 9.4 MG/DL — SIGNIFICANT CHANGE UP (ref 8.4–10.5)
CHLORIDE SERPL-SCNC: 106 MMOL/L — SIGNIFICANT CHANGE UP (ref 98–107)
CO2 SERPL-SCNC: 28 MMOL/L — SIGNIFICANT CHANGE UP (ref 22–31)
CREAT SERPL-MCNC: 1.09 MG/DL — SIGNIFICANT CHANGE UP (ref 0.5–1.3)
EOSINOPHIL # BLD AUTO: 0.02 K/UL — SIGNIFICANT CHANGE UP (ref 0–0.5)
EOSINOPHIL NFR BLD AUTO: 0.3 % — SIGNIFICANT CHANGE UP (ref 0–6)
GLUCOSE SERPL-MCNC: 81 MG/DL — SIGNIFICANT CHANGE UP (ref 70–99)
HCT VFR BLD CALC: 44.6 % — SIGNIFICANT CHANGE UP (ref 39–50)
HGB BLD-MCNC: 13.9 G/DL — SIGNIFICANT CHANGE UP (ref 13–17)
IMM GRANULOCYTES NFR BLD AUTO: 0.5 % — SIGNIFICANT CHANGE UP (ref 0–1.5)
LYMPHOCYTES # BLD AUTO: 2.39 K/UL — SIGNIFICANT CHANGE UP (ref 1–3.3)
LYMPHOCYTES # BLD AUTO: 37.1 % — SIGNIFICANT CHANGE UP (ref 13–44)
MCHC RBC-ENTMCNC: 26.4 PG — LOW (ref 27–34)
MCHC RBC-ENTMCNC: 31.2 % — LOW (ref 32–36)
MCV RBC AUTO: 84.6 FL — SIGNIFICANT CHANGE UP (ref 80–100)
MONOCYTES # BLD AUTO: 0.63 K/UL — SIGNIFICANT CHANGE UP (ref 0–0.9)
MONOCYTES NFR BLD AUTO: 9.8 % — SIGNIFICANT CHANGE UP (ref 2–14)
NEUTROPHILS # BLD AUTO: 3.37 K/UL — SIGNIFICANT CHANGE UP (ref 1.8–7.4)
NEUTROPHILS NFR BLD AUTO: 52.1 % — SIGNIFICANT CHANGE UP (ref 43–77)
NRBC # FLD: 0 K/UL — SIGNIFICANT CHANGE UP (ref 0–0)
PLATELET # BLD AUTO: 219 K/UL — SIGNIFICANT CHANGE UP (ref 150–400)
PMV BLD: 11.3 FL — SIGNIFICANT CHANGE UP (ref 7–13)
POTASSIUM SERPL-MCNC: 4.2 MMOL/L — SIGNIFICANT CHANGE UP (ref 3.5–5.3)
POTASSIUM SERPL-SCNC: 4.2 MMOL/L — SIGNIFICANT CHANGE UP (ref 3.5–5.3)
PROT SERPL-MCNC: 7 G/DL — SIGNIFICANT CHANGE UP (ref 6–8.3)
RBC # BLD: 5.27 M/UL — SIGNIFICANT CHANGE UP (ref 4.2–5.8)
RBC # FLD: 14.7 % — HIGH (ref 10.3–14.5)
SODIUM SERPL-SCNC: 144 MMOL/L — SIGNIFICANT CHANGE UP (ref 135–145)
WBC # BLD: 6.45 K/UL — SIGNIFICANT CHANGE UP (ref 3.8–10.5)
WBC # FLD AUTO: 6.45 K/UL — SIGNIFICANT CHANGE UP (ref 3.8–10.5)

## 2019-04-12 PROCEDURE — 90792 PSYCH DIAG EVAL W/MED SRVCS: CPT

## 2019-04-12 PROCEDURE — 93010 ELECTROCARDIOGRAM REPORT: CPT

## 2019-04-12 RX ORDER — PROPRANOLOL HCL 160 MG
20 CAPSULE, EXTENDED RELEASE 24HR ORAL
Qty: 0 | Refills: 0 | Status: DISCONTINUED | OUTPATIENT
Start: 2019-04-12 | End: 2019-04-25

## 2019-04-12 RX ORDER — CLOZAPINE 150 MG/1
400 TABLET, ORALLY DISINTEGRATING ORAL AT BEDTIME
Qty: 0 | Refills: 0 | Status: DISCONTINUED | OUTPATIENT
Start: 2019-04-12 | End: 2019-04-23

## 2019-04-12 RX ORDER — HALOPERIDOL DECANOATE 100 MG/ML
5 INJECTION INTRAMUSCULAR EVERY 6 HOURS
Qty: 0 | Refills: 0 | Status: DISCONTINUED | OUTPATIENT
Start: 2019-04-12 | End: 2019-04-25

## 2019-04-12 RX ORDER — DIPHENHYDRAMINE HCL 50 MG
50 CAPSULE ORAL EVERY 6 HOURS
Qty: 0 | Refills: 0 | Status: DISCONTINUED | OUTPATIENT
Start: 2019-04-12 | End: 2019-04-25

## 2019-04-12 RX ORDER — RISPERIDONE 4 MG/1
4 TABLET ORAL AT BEDTIME
Qty: 0 | Refills: 0 | Status: DISCONTINUED | OUTPATIENT
Start: 2019-04-12 | End: 2019-04-22

## 2019-04-12 RX ORDER — VENLAFAXINE HCL 75 MG
187.5 CAPSULE, EXT RELEASE 24 HR ORAL DAILY
Qty: 0 | Refills: 0 | Status: DISCONTINUED | OUTPATIENT
Start: 2019-04-13 | End: 2019-04-15

## 2019-04-12 RX ORDER — DIVALPROEX SODIUM 500 MG/1
500 TABLET, DELAYED RELEASE ORAL
Qty: 0 | Refills: 0 | Status: DISCONTINUED | OUTPATIENT
Start: 2019-04-12 | End: 2019-04-25

## 2019-04-12 RX ORDER — LANOLIN ALCOHOL/MO/W.PET/CERES
3 CREAM (GRAM) TOPICAL AT BEDTIME
Qty: 0 | Refills: 0 | Status: DISCONTINUED | OUTPATIENT
Start: 2019-04-12 | End: 2019-04-25

## 2019-04-12 RX ORDER — ACETAMINOPHEN 500 MG
650 TABLET ORAL EVERY 6 HOURS
Qty: 0 | Refills: 0 | Status: DISCONTINUED | OUTPATIENT
Start: 2019-04-12 | End: 2019-04-25

## 2019-04-12 RX ADMIN — RISPERIDONE 4 MILLIGRAM(S): 4 TABLET ORAL at 22:18

## 2019-04-12 RX ADMIN — Medication 0.5 MILLIGRAM(S): at 22:18

## 2019-04-12 RX ADMIN — DIVALPROEX SODIUM 500 MILLIGRAM(S): 500 TABLET, DELAYED RELEASE ORAL at 22:18

## 2019-04-12 RX ADMIN — CLOZAPINE 400 MILLIGRAM(S): 150 TABLET, ORALLY DISINTEGRATING ORAL at 22:18

## 2019-04-12 RX ADMIN — Medication 20 MILLIGRAM(S): at 22:18

## 2019-04-12 NOTE — CHART NOTE - NSCHARTNOTEFT_GEN_A_CORE
Screening Medical Evaluation  Patient Admitted from: [facility] admitting diagnosis: Schizoaffective disorder, bipolar type    PAST MEDICAL & SURGICAL HISTORY:  Schizo affective schizophrenia  Anxiety  Psychosis  Depression  No significant past surgical history        Allergies    No Known Allergies    Intolerances        Social History:     FAMILY HISTORY:  No pertinent family history in first degree relatives      MEDICATIONS  (STANDING):  cloZAPine 400 milliGRAM(s) Oral at bedtime  diVALproex  milliGRAM(s) Oral two times a day  LORazepam     Tablet 0.5 milliGRAM(s) Oral two times a day  propranolol 20 milliGRAM(s) Oral two times a day  risperiDONE  Disintegrating Tablet 4 milliGRAM(s) Oral at bedtime    MEDICATIONS  (PRN):  acetaminophen   Tablet .. 650 milliGRAM(s) Oral every 6 hours PRN Mild Pain (1 - 3), Moderate Pain (4 - 6), Severe Pain (7 - 10)  aluminum hydroxide/magnesium hydroxide/simethicone Suspension 30 milliLiter(s) Oral every 6 hours PRN Dyspepsia  diphenhydrAMINE 50 milliGRAM(s) Oral every 6 hours PRN agitation/anxiety  diphenhydrAMINE   Injectable 50 milliGRAM(s) IntraMuscular every 6 hours PRN agitation/anxiety  haloperidol     Tablet 5 milliGRAM(s) Oral every 6 hours PRN agitation/anxiety  haloperidol    Injectable 5 milliGRAM(s) IntraMuscular every 6 hours PRN agitation/anxiety  LORazepam     Tablet 2 milliGRAM(s) Oral every 6 hours PRN agitation/anxiety  LORazepam   Injectable 2 milliGRAM(s) IntraMuscular every 6 hours PRN agitation/anxiety  melatonin. 3 milliGRAM(s) Oral at bedtime PRN Insomnia      Vital Signs Last 24 Hrs  T(C): 36.9 (12 Apr 2019 11:50), Max: 36.9 (12 Apr 2019 11:50)  T(F): 98.4 (12 Apr 2019 11:50), Max: 98.4 (12 Apr 2019 11:50)  HR: 102  BP: 111/91  BP(mean): --  RR: 17 (12 Apr 2019 11:50) (17 - 17)  SpO2: --  CAPILLARY BLOOD GLUCOSE            PHYSICAL EXAM:  GENERAL: NAD, well-developed  HEAD:  Atraumatic, Normocephalic  EYES: EOMI, PERRLA, conjunctiva and sclera clear  NECK: Supple.  CHEST/LUNG: Clear to auscultation bilaterally; No wheeze  HEART: Regular rate and rhythm; No murmurs, rubs, or gallops  ABDOMEN: Soft, Nontender, Nondistended; Bowel sounds present  EXTREMITIES:  2+ Peripheral Pulses, No cyanosis, or edema  PSYCH: AAOx3  NEUROLOGY: non-focal  SKIN: No rashes or lesions    LABS:                        13.9   6.45  )-----------( 219      ( 12 Apr 2019 15:58 )             44.6     04-12    144  |  106  |  10  ----------------------------<  81  4.2   |  28  |  1.09    Ca    9.4      12 Apr 2019 15:58    TPro  7.0  /  Alb  4.1  /  TBili  0.2  /  DBili  x   /  AST  14  /  ALT  17  /  AlkPhos  74  04-12              RADIOLOGY & ADDITIONAL TESTS:    Assessment and Plan:  29 year old male presenting today from [place] to [facility] with admitting diagnosis of Schizoaffective disorder, bipolar type with no pertinent PMH. Denies any medical concerns at this time. Denies any fever, chillsm headache, chest pain, SOB, abdominal pain, N/V/D/C, dysuria.  1)	Schizoaffective disorder, bipolar type: Follow care plan as per primary psych team. Screening Medical Evaluation  Patient Admitted from: [facility] admitting diagnosis: Schizoaffective disorder, bipolar type    PAST MEDICAL & SURGICAL HISTORY:  Schizo affective schizophrenia  Anxiety  Psychosis  Depression  No significant past surgical history        Allergies    No Known Allergies    Intolerances        Social History:     FAMILY HISTORY:  No pertinent family history in first degree relatives      MEDICATIONS  (STANDING):  cloZAPine 400 milliGRAM(s) Oral at bedtime  diVALproex  milliGRAM(s) Oral two times a day  LORazepam     Tablet 0.5 milliGRAM(s) Oral two times a day  propranolol 20 milliGRAM(s) Oral two times a day  risperiDONE  Disintegrating Tablet 4 milliGRAM(s) Oral at bedtime    MEDICATIONS  (PRN):  acetaminophen   Tablet .. 650 milliGRAM(s) Oral every 6 hours PRN Mild Pain (1 - 3), Moderate Pain (4 - 6), Severe Pain (7 - 10)  aluminum hydroxide/magnesium hydroxide/simethicone Suspension 30 milliLiter(s) Oral every 6 hours PRN Dyspepsia  diphenhydrAMINE 50 milliGRAM(s) Oral every 6 hours PRN agitation/anxiety  diphenhydrAMINE   Injectable 50 milliGRAM(s) IntraMuscular every 6 hours PRN agitation/anxiety  haloperidol     Tablet 5 milliGRAM(s) Oral every 6 hours PRN agitation/anxiety  haloperidol    Injectable 5 milliGRAM(s) IntraMuscular every 6 hours PRN agitation/anxiety  LORazepam     Tablet 2 milliGRAM(s) Oral every 6 hours PRN agitation/anxiety  LORazepam   Injectable 2 milliGRAM(s) IntraMuscular every 6 hours PRN agitation/anxiety  melatonin. 3 milliGRAM(s) Oral at bedtime PRN Insomnia      Vital Signs Last 24 Hrs  T(C): 36.9 (12 Apr 2019 11:50), Max: 36.9 (12 Apr 2019 11:50)  T(F): 98.4 (12 Apr 2019 11:50), Max: 98.4 (12 Apr 2019 11:50)  HR: 102  BP: 111/91  BP(mean): --  RR: 17 (12 Apr 2019 11:50) (17 - 17)  SpO2: --  CAPILLARY BLOOD GLUCOSE            PHYSICAL EXAM:  GENERAL: NAD, well-developed  HEAD:  Atraumatic, Normocephalic  EYES: EOMI, PERRLA, conjunctiva and sclera clear  NECK: Supple.  CHEST/LUNG: Clear to auscultation bilaterally; No wheeze  HEART: Regular rate and rhythm; No murmurs, rubs, or gallops  ABDOMEN: Soft, Nontender, Nondistended; Bowel sounds present  EXTREMITIES:  2+ Peripheral Pulses, No cyanosis, or edema  PSYCH: AAOx3  NEUROLOGY: non-focal  SKIN: No rashes or lesions    LABS:                        13.9   6.45  )-----------( 219      ( 12 Apr 2019 15:58 )             44.6     04-12    144  |  106  |  10  ----------------------------<  81  4.2   |  28  |  1.09    Ca    9.4      12 Apr 2019 15:58    TPro  7.0  /  Alb  4.1  /  TBili  0.2  /  DBili  x   /  AST  14  /  ALT  17  /  AlkPhos  74  04-12              RADIOLOGY & ADDITIONAL TESTS:    Assessment and Plan:  29 year old male presenting today from [place] to [facility] with admitting diagnosis of Schizoaffective disorder, bipolar type with no pertinent PMH. Denies any medical concerns at this time. Denies any fever, chillsm headache, chest pain, SOB, abdominal pain, N/V/D/C, dysuria.  1)	Schizoaffective disorder, bipolar type: Follow care plan as per primary psych team.  2) Tachycardia: Continue propanolol 20 mg oral daily.

## 2019-04-13 PROCEDURE — 99232 SBSQ HOSP IP/OBS MODERATE 35: CPT

## 2019-04-13 RX ADMIN — Medication 20 MILLIGRAM(S): at 11:05

## 2019-04-13 RX ADMIN — Medication 20 MILLIGRAM(S): at 21:28

## 2019-04-13 RX ADMIN — Medication 0.5 MILLIGRAM(S): at 21:28

## 2019-04-13 RX ADMIN — Medication 50 MILLIGRAM(S): at 19:34

## 2019-04-13 RX ADMIN — CLOZAPINE 400 MILLIGRAM(S): 150 TABLET, ORALLY DISINTEGRATING ORAL at 21:28

## 2019-04-13 RX ADMIN — HALOPERIDOL DECANOATE 5 MILLIGRAM(S): 100 INJECTION INTRAMUSCULAR at 19:34

## 2019-04-13 RX ADMIN — Medication 2 MILLIGRAM(S): at 22:24

## 2019-04-13 RX ADMIN — RISPERIDONE 4 MILLIGRAM(S): 4 TABLET ORAL at 21:28

## 2019-04-13 RX ADMIN — Medication 3 MILLIGRAM(S): at 19:34

## 2019-04-13 RX ADMIN — DIVALPROEX SODIUM 500 MILLIGRAM(S): 500 TABLET, DELAYED RELEASE ORAL at 21:28

## 2019-04-13 RX ADMIN — DIVALPROEX SODIUM 500 MILLIGRAM(S): 500 TABLET, DELAYED RELEASE ORAL at 11:04

## 2019-04-13 RX ADMIN — Medication 187.5 MILLIGRAM(S): at 11:05

## 2019-04-13 RX ADMIN — Medication 0.5 MILLIGRAM(S): at 11:05

## 2019-04-14 PROCEDURE — 99232 SBSQ HOSP IP/OBS MODERATE 35: CPT

## 2019-04-14 RX ADMIN — Medication 0.5 MILLIGRAM(S): at 08:49

## 2019-04-14 RX ADMIN — Medication 20 MILLIGRAM(S): at 20:44

## 2019-04-14 RX ADMIN — Medication 2 MILLIGRAM(S): at 15:30

## 2019-04-14 RX ADMIN — RISPERIDONE 4 MILLIGRAM(S): 4 TABLET ORAL at 20:44

## 2019-04-14 RX ADMIN — Medication 50 MILLIGRAM(S): at 21:30

## 2019-04-14 RX ADMIN — Medication 20 MILLIGRAM(S): at 08:49

## 2019-04-14 RX ADMIN — Medication 187.5 MILLIGRAM(S): at 08:49

## 2019-04-14 RX ADMIN — Medication 3 MILLIGRAM(S): at 20:50

## 2019-04-14 RX ADMIN — DIVALPROEX SODIUM 500 MILLIGRAM(S): 500 TABLET, DELAYED RELEASE ORAL at 20:44

## 2019-04-14 RX ADMIN — Medication 50 MILLIGRAM(S): at 15:30

## 2019-04-14 RX ADMIN — DIVALPROEX SODIUM 500 MILLIGRAM(S): 500 TABLET, DELAYED RELEASE ORAL at 08:49

## 2019-04-14 RX ADMIN — Medication 0.5 MILLIGRAM(S): at 20:44

## 2019-04-14 RX ADMIN — HALOPERIDOL DECANOATE 5 MILLIGRAM(S): 100 INJECTION INTRAMUSCULAR at 15:30

## 2019-04-14 RX ADMIN — CLOZAPINE 400 MILLIGRAM(S): 150 TABLET, ORALLY DISINTEGRATING ORAL at 20:44

## 2019-04-15 PROCEDURE — 99232 SBSQ HOSP IP/OBS MODERATE 35: CPT

## 2019-04-15 RX ORDER — VENLAFAXINE HCL 75 MG
225 CAPSULE, EXT RELEASE 24 HR ORAL DAILY
Qty: 0 | Refills: 0 | Status: DISCONTINUED | OUTPATIENT
Start: 2019-04-16 | End: 2019-04-25

## 2019-04-15 RX ADMIN — Medication 0.5 MILLIGRAM(S): at 20:49

## 2019-04-15 RX ADMIN — Medication 20 MILLIGRAM(S): at 09:16

## 2019-04-15 RX ADMIN — HALOPERIDOL DECANOATE 5 MILLIGRAM(S): 100 INJECTION INTRAMUSCULAR at 13:40

## 2019-04-15 RX ADMIN — CLOZAPINE 400 MILLIGRAM(S): 150 TABLET, ORALLY DISINTEGRATING ORAL at 20:49

## 2019-04-15 RX ADMIN — Medication 2 MILLIGRAM(S): at 21:50

## 2019-04-15 RX ADMIN — Medication 50 MILLIGRAM(S): at 20:50

## 2019-04-15 RX ADMIN — RISPERIDONE 4 MILLIGRAM(S): 4 TABLET ORAL at 20:50

## 2019-04-15 RX ADMIN — HALOPERIDOL DECANOATE 5 MILLIGRAM(S): 100 INJECTION INTRAMUSCULAR at 21:50

## 2019-04-15 RX ADMIN — DIVALPROEX SODIUM 500 MILLIGRAM(S): 500 TABLET, DELAYED RELEASE ORAL at 09:16

## 2019-04-15 RX ADMIN — Medication 3 MILLIGRAM(S): at 20:50

## 2019-04-15 RX ADMIN — Medication 2 MILLIGRAM(S): at 13:40

## 2019-04-15 RX ADMIN — Medication 50 MILLIGRAM(S): at 13:39

## 2019-04-15 RX ADMIN — DIVALPROEX SODIUM 500 MILLIGRAM(S): 500 TABLET, DELAYED RELEASE ORAL at 20:49

## 2019-04-15 RX ADMIN — Medication 0.5 MILLIGRAM(S): at 09:16

## 2019-04-15 RX ADMIN — Medication 20 MILLIGRAM(S): at 20:49

## 2019-04-15 RX ADMIN — Medication 187.5 MILLIGRAM(S): at 09:16

## 2019-04-16 PROCEDURE — 99232 SBSQ HOSP IP/OBS MODERATE 35: CPT

## 2019-04-16 RX ORDER — DIPHENHYDRAMINE HCL 50 MG
50 CAPSULE ORAL ONCE
Qty: 0 | Refills: 0 | Status: DISCONTINUED | OUTPATIENT
Start: 2019-04-16 | End: 2019-04-16

## 2019-04-16 RX ADMIN — DIVALPROEX SODIUM 500 MILLIGRAM(S): 500 TABLET, DELAYED RELEASE ORAL at 09:14

## 2019-04-16 RX ADMIN — Medication 50 MILLIGRAM(S): at 18:30

## 2019-04-16 RX ADMIN — CLOZAPINE 400 MILLIGRAM(S): 150 TABLET, ORALLY DISINTEGRATING ORAL at 20:58

## 2019-04-16 RX ADMIN — Medication 225 MILLIGRAM(S): at 09:14

## 2019-04-16 RX ADMIN — Medication 3 MILLIGRAM(S): at 21:58

## 2019-04-16 RX ADMIN — Medication 20 MILLIGRAM(S): at 20:58

## 2019-04-16 RX ADMIN — Medication 2 MILLIGRAM(S): at 18:30

## 2019-04-16 RX ADMIN — RISPERIDONE 4 MILLIGRAM(S): 4 TABLET ORAL at 20:59

## 2019-04-16 RX ADMIN — DIVALPROEX SODIUM 500 MILLIGRAM(S): 500 TABLET, DELAYED RELEASE ORAL at 20:58

## 2019-04-16 RX ADMIN — Medication 0.5 MILLIGRAM(S): at 09:14

## 2019-04-16 RX ADMIN — Medication 0.5 MILLIGRAM(S): at 20:58

## 2019-04-16 RX ADMIN — Medication 50 MILLIGRAM(S): at 11:17

## 2019-04-16 RX ADMIN — HALOPERIDOL DECANOATE 5 MILLIGRAM(S): 100 INJECTION INTRAMUSCULAR at 11:17

## 2019-04-16 RX ADMIN — Medication 20 MILLIGRAM(S): at 09:14

## 2019-04-16 RX ADMIN — Medication 2 MILLIGRAM(S): at 11:17

## 2019-04-16 RX ADMIN — HALOPERIDOL DECANOATE 5 MILLIGRAM(S): 100 INJECTION INTRAMUSCULAR at 18:30

## 2019-04-17 PROCEDURE — 99232 SBSQ HOSP IP/OBS MODERATE 35: CPT

## 2019-04-17 RX ADMIN — Medication 3 MILLIGRAM(S): at 21:01

## 2019-04-17 RX ADMIN — HALOPERIDOL DECANOATE 5 MILLIGRAM(S): 100 INJECTION INTRAMUSCULAR at 17:55

## 2019-04-17 RX ADMIN — Medication 50 MILLIGRAM(S): at 17:55

## 2019-04-17 RX ADMIN — Medication 20 MILLIGRAM(S): at 21:54

## 2019-04-17 RX ADMIN — Medication 0.5 MILLIGRAM(S): at 08:52

## 2019-04-17 RX ADMIN — Medication 20 MILLIGRAM(S): at 08:52

## 2019-04-17 RX ADMIN — Medication 225 MILLIGRAM(S): at 08:52

## 2019-04-17 RX ADMIN — RISPERIDONE 4 MILLIGRAM(S): 4 TABLET ORAL at 21:54

## 2019-04-17 RX ADMIN — Medication 2 MILLIGRAM(S): at 17:56

## 2019-04-17 RX ADMIN — DIVALPROEX SODIUM 500 MILLIGRAM(S): 500 TABLET, DELAYED RELEASE ORAL at 21:54

## 2019-04-17 RX ADMIN — Medication 0.5 MILLIGRAM(S): at 21:54

## 2019-04-17 RX ADMIN — CLOZAPINE 400 MILLIGRAM(S): 150 TABLET, ORALLY DISINTEGRATING ORAL at 21:54

## 2019-04-17 RX ADMIN — DIVALPROEX SODIUM 500 MILLIGRAM(S): 500 TABLET, DELAYED RELEASE ORAL at 08:52

## 2019-04-18 PROCEDURE — 99232 SBSQ HOSP IP/OBS MODERATE 35: CPT

## 2019-04-18 RX ADMIN — Medication 225 MILLIGRAM(S): at 09:22

## 2019-04-18 RX ADMIN — Medication 3 MILLIGRAM(S): at 21:26

## 2019-04-18 RX ADMIN — DIVALPROEX SODIUM 500 MILLIGRAM(S): 500 TABLET, DELAYED RELEASE ORAL at 09:22

## 2019-04-18 RX ADMIN — Medication 20 MILLIGRAM(S): at 09:22

## 2019-04-18 RX ADMIN — Medication 0.5 MILLIGRAM(S): at 09:22

## 2019-04-18 RX ADMIN — DIVALPROEX SODIUM 500 MILLIGRAM(S): 500 TABLET, DELAYED RELEASE ORAL at 21:26

## 2019-04-18 RX ADMIN — Medication 50 MILLIGRAM(S): at 21:27

## 2019-04-18 RX ADMIN — RISPERIDONE 4 MILLIGRAM(S): 4 TABLET ORAL at 21:26

## 2019-04-18 RX ADMIN — CLOZAPINE 400 MILLIGRAM(S): 150 TABLET, ORALLY DISINTEGRATING ORAL at 21:26

## 2019-04-18 RX ADMIN — Medication 0.5 MILLIGRAM(S): at 21:26

## 2019-04-18 RX ADMIN — Medication 20 MILLIGRAM(S): at 21:26

## 2019-04-19 RX ADMIN — Medication 0.5 MILLIGRAM(S): at 08:29

## 2019-04-19 RX ADMIN — Medication 20 MILLIGRAM(S): at 20:50

## 2019-04-19 RX ADMIN — DIVALPROEX SODIUM 500 MILLIGRAM(S): 500 TABLET, DELAYED RELEASE ORAL at 08:29

## 2019-04-19 RX ADMIN — CLOZAPINE 400 MILLIGRAM(S): 150 TABLET, ORALLY DISINTEGRATING ORAL at 20:50

## 2019-04-19 RX ADMIN — RISPERIDONE 4 MILLIGRAM(S): 4 TABLET ORAL at 20:50

## 2019-04-19 RX ADMIN — Medication 225 MILLIGRAM(S): at 08:29

## 2019-04-19 RX ADMIN — Medication 0.5 MILLIGRAM(S): at 20:50

## 2019-04-19 RX ADMIN — Medication 3 MILLIGRAM(S): at 20:30

## 2019-04-19 RX ADMIN — DIVALPROEX SODIUM 500 MILLIGRAM(S): 500 TABLET, DELAYED RELEASE ORAL at 20:50

## 2019-04-19 RX ADMIN — Medication 20 MILLIGRAM(S): at 08:29

## 2019-04-19 NOTE — PHARMACOTHERAPY INTERVENTION NOTE - COMMENTS
Patient on Clozapine and requires weekly monitoring of ANC, last ANC obtained 4/12. Contacted Dr Colorado to order stat labs, waiting for MD to order labs. Patient on Clozapine and requires weekly monitoring of ANC, last ANC obtained 4/12. Contacted Dr Colorado to order stat labs, waiting for MD to order labs.    **Update: Dr Colorado ordered labs for 4/20. Pharmacy to update ANC in Clozapine REMS pending ANC results tomorrow.

## 2019-04-20 LAB
BASOPHILS # BLD AUTO: 0.02 K/UL — SIGNIFICANT CHANGE UP (ref 0–0.2)
BASOPHILS NFR BLD AUTO: 0.2 % — SIGNIFICANT CHANGE UP (ref 0–2)
EOSINOPHIL # BLD AUTO: 0.02 K/UL — SIGNIFICANT CHANGE UP (ref 0–0.5)
EOSINOPHIL NFR BLD AUTO: 0.2 % — SIGNIFICANT CHANGE UP (ref 0–6)
HCT VFR BLD CALC: 44.8 % — SIGNIFICANT CHANGE UP (ref 39–50)
HGB BLD-MCNC: 14 G/DL — SIGNIFICANT CHANGE UP (ref 13–17)
IMM GRANULOCYTES NFR BLD AUTO: 0.7 % — SIGNIFICANT CHANGE UP (ref 0–1.5)
LYMPHOCYTES # BLD AUTO: 4.08 K/UL — HIGH (ref 1–3.3)
LYMPHOCYTES # BLD AUTO: 47.8 % — HIGH (ref 13–44)
MCHC RBC-ENTMCNC: 26.4 PG — LOW (ref 27–34)
MCHC RBC-ENTMCNC: 31.3 % — LOW (ref 32–36)
MCV RBC AUTO: 84.5 FL — SIGNIFICANT CHANGE UP (ref 80–100)
MONOCYTES # BLD AUTO: 0.81 K/UL — SIGNIFICANT CHANGE UP (ref 0–0.9)
MONOCYTES NFR BLD AUTO: 9.5 % — SIGNIFICANT CHANGE UP (ref 2–14)
NEUTROPHILS # BLD AUTO: 3.54 K/UL — SIGNIFICANT CHANGE UP (ref 1.8–7.4)
NEUTROPHILS NFR BLD AUTO: 41.6 % — LOW (ref 43–77)
NRBC # FLD: 0 K/UL — SIGNIFICANT CHANGE UP (ref 0–0)
PLATELET # BLD AUTO: 207 K/UL — SIGNIFICANT CHANGE UP (ref 150–400)
PMV BLD: 11.7 FL — SIGNIFICANT CHANGE UP (ref 7–13)
RBC # BLD: 5.3 M/UL — SIGNIFICANT CHANGE UP (ref 4.2–5.8)
RBC # FLD: 14.6 % — HIGH (ref 10.3–14.5)
WBC # BLD: 8.53 K/UL — SIGNIFICANT CHANGE UP (ref 3.8–10.5)
WBC # FLD AUTO: 8.53 K/UL — SIGNIFICANT CHANGE UP (ref 3.8–10.5)

## 2019-04-20 RX ADMIN — CLOZAPINE 400 MILLIGRAM(S): 150 TABLET, ORALLY DISINTEGRATING ORAL at 21:23

## 2019-04-20 RX ADMIN — Medication 3 MILLIGRAM(S): at 21:20

## 2019-04-20 RX ADMIN — Medication 20 MILLIGRAM(S): at 21:24

## 2019-04-20 RX ADMIN — Medication 0.5 MILLIGRAM(S): at 08:47

## 2019-04-20 RX ADMIN — Medication 0.5 MILLIGRAM(S): at 21:24

## 2019-04-20 RX ADMIN — Medication 225 MILLIGRAM(S): at 08:47

## 2019-04-20 RX ADMIN — RISPERIDONE 4 MILLIGRAM(S): 4 TABLET ORAL at 21:24

## 2019-04-20 RX ADMIN — Medication 20 MILLIGRAM(S): at 08:47

## 2019-04-20 RX ADMIN — DIVALPROEX SODIUM 500 MILLIGRAM(S): 500 TABLET, DELAYED RELEASE ORAL at 08:47

## 2019-04-20 RX ADMIN — DIVALPROEX SODIUM 500 MILLIGRAM(S): 500 TABLET, DELAYED RELEASE ORAL at 21:23

## 2019-04-21 RX ADMIN — CLOZAPINE 400 MILLIGRAM(S): 150 TABLET, ORALLY DISINTEGRATING ORAL at 22:18

## 2019-04-21 RX ADMIN — DIVALPROEX SODIUM 500 MILLIGRAM(S): 500 TABLET, DELAYED RELEASE ORAL at 09:44

## 2019-04-21 RX ADMIN — RISPERIDONE 4 MILLIGRAM(S): 4 TABLET ORAL at 22:19

## 2019-04-21 RX ADMIN — Medication 20 MILLIGRAM(S): at 09:44

## 2019-04-21 RX ADMIN — Medication 0.5 MILLIGRAM(S): at 09:44

## 2019-04-21 RX ADMIN — HALOPERIDOL DECANOATE 5 MILLIGRAM(S): 100 INJECTION INTRAMUSCULAR at 12:35

## 2019-04-21 RX ADMIN — Medication 0.5 MILLIGRAM(S): at 22:18

## 2019-04-21 RX ADMIN — Medication 2 MILLIGRAM(S): at 12:35

## 2019-04-21 RX ADMIN — Medication 3 MILLIGRAM(S): at 22:58

## 2019-04-21 RX ADMIN — Medication 20 MILLIGRAM(S): at 22:18

## 2019-04-21 RX ADMIN — Medication 50 MILLIGRAM(S): at 12:35

## 2019-04-21 RX ADMIN — Medication 225 MILLIGRAM(S): at 09:44

## 2019-04-21 RX ADMIN — DIVALPROEX SODIUM 500 MILLIGRAM(S): 500 TABLET, DELAYED RELEASE ORAL at 22:18

## 2019-04-22 PROCEDURE — 90832 PSYTX W PT 30 MINUTES: CPT

## 2019-04-22 PROCEDURE — 99232 SBSQ HOSP IP/OBS MODERATE 35: CPT

## 2019-04-22 RX ORDER — RISPERIDONE 4 MG/1
5 TABLET ORAL AT BEDTIME
Qty: 0 | Refills: 0 | Status: DISCONTINUED | OUTPATIENT
Start: 2019-04-22 | End: 2019-04-25

## 2019-04-22 RX ADMIN — Medication 225 MILLIGRAM(S): at 08:55

## 2019-04-22 RX ADMIN — Medication 0.5 MILLIGRAM(S): at 08:55

## 2019-04-22 RX ADMIN — Medication 20 MILLIGRAM(S): at 20:55

## 2019-04-22 RX ADMIN — DIVALPROEX SODIUM 500 MILLIGRAM(S): 500 TABLET, DELAYED RELEASE ORAL at 08:55

## 2019-04-22 RX ADMIN — Medication 2 MILLIGRAM(S): at 15:42

## 2019-04-22 RX ADMIN — Medication 20 MILLIGRAM(S): at 08:55

## 2019-04-22 RX ADMIN — RISPERIDONE 5 MILLIGRAM(S): 4 TABLET ORAL at 20:55

## 2019-04-22 RX ADMIN — CLOZAPINE 400 MILLIGRAM(S): 150 TABLET, ORALLY DISINTEGRATING ORAL at 20:55

## 2019-04-22 RX ADMIN — HALOPERIDOL DECANOATE 5 MILLIGRAM(S): 100 INJECTION INTRAMUSCULAR at 15:42

## 2019-04-22 RX ADMIN — Medication 0.5 MILLIGRAM(S): at 20:55

## 2019-04-22 RX ADMIN — DIVALPROEX SODIUM 500 MILLIGRAM(S): 500 TABLET, DELAYED RELEASE ORAL at 20:55

## 2019-04-22 RX ADMIN — Medication 3 MILLIGRAM(S): at 20:55

## 2019-04-22 RX ADMIN — Medication 50 MILLIGRAM(S): at 15:42

## 2019-04-23 PROCEDURE — 90832 PSYTX W PT 30 MINUTES: CPT

## 2019-04-23 PROCEDURE — 99232 SBSQ HOSP IP/OBS MODERATE 35: CPT

## 2019-04-23 RX ORDER — CLOZAPINE 150 MG/1
350 TABLET, ORALLY DISINTEGRATING ORAL AT BEDTIME
Qty: 0 | Refills: 0 | Status: DISCONTINUED | OUTPATIENT
Start: 2019-04-23 | End: 2019-04-25

## 2019-04-23 RX ADMIN — Medication 0.5 MILLIGRAM(S): at 21:00

## 2019-04-23 RX ADMIN — DIVALPROEX SODIUM 500 MILLIGRAM(S): 500 TABLET, DELAYED RELEASE ORAL at 08:51

## 2019-04-23 RX ADMIN — Medication 20 MILLIGRAM(S): at 08:51

## 2019-04-23 RX ADMIN — DIVALPROEX SODIUM 500 MILLIGRAM(S): 500 TABLET, DELAYED RELEASE ORAL at 21:00

## 2019-04-23 RX ADMIN — Medication 225 MILLIGRAM(S): at 08:52

## 2019-04-23 RX ADMIN — CLOZAPINE 350 MILLIGRAM(S): 150 TABLET, ORALLY DISINTEGRATING ORAL at 21:00

## 2019-04-23 RX ADMIN — HALOPERIDOL DECANOATE 5 MILLIGRAM(S): 100 INJECTION INTRAMUSCULAR at 17:04

## 2019-04-23 RX ADMIN — Medication 3 MILLIGRAM(S): at 21:00

## 2019-04-23 RX ADMIN — Medication 2 MILLIGRAM(S): at 17:04

## 2019-04-23 RX ADMIN — RISPERIDONE 5 MILLIGRAM(S): 4 TABLET ORAL at 21:00

## 2019-04-23 RX ADMIN — Medication 0.5 MILLIGRAM(S): at 08:51

## 2019-04-23 RX ADMIN — Medication 20 MILLIGRAM(S): at 21:00

## 2019-04-23 RX ADMIN — Medication 50 MILLIGRAM(S): at 17:04

## 2019-04-24 LAB
BASOPHILS # BLD AUTO: 0.03 K/UL — SIGNIFICANT CHANGE UP (ref 0–0.2)
BASOPHILS NFR BLD AUTO: 0.4 % — SIGNIFICANT CHANGE UP (ref 0–2)
EOSINOPHIL # BLD AUTO: 0.02 K/UL — SIGNIFICANT CHANGE UP (ref 0–0.5)
EOSINOPHIL NFR BLD AUTO: 0.3 % — SIGNIFICANT CHANGE UP (ref 0–6)
HCT VFR BLD CALC: 47.3 % — SIGNIFICANT CHANGE UP (ref 39–50)
HGB BLD-MCNC: 14.4 G/DL — SIGNIFICANT CHANGE UP (ref 13–17)
IMM GRANULOCYTES NFR BLD AUTO: 0.7 % — SIGNIFICANT CHANGE UP (ref 0–1.5)
LYMPHOCYTES # BLD AUTO: 2.99 K/UL — SIGNIFICANT CHANGE UP (ref 1–3.3)
LYMPHOCYTES # BLD AUTO: 39.7 % — SIGNIFICANT CHANGE UP (ref 13–44)
MCHC RBC-ENTMCNC: 26.7 PG — LOW (ref 27–34)
MCHC RBC-ENTMCNC: 30.4 % — LOW (ref 32–36)
MCV RBC AUTO: 87.8 FL — SIGNIFICANT CHANGE UP (ref 80–100)
MONOCYTES # BLD AUTO: 0.58 K/UL — SIGNIFICANT CHANGE UP (ref 0–0.9)
MONOCYTES NFR BLD AUTO: 7.7 % — SIGNIFICANT CHANGE UP (ref 2–14)
NEUTROPHILS # BLD AUTO: 3.86 K/UL — SIGNIFICANT CHANGE UP (ref 1.8–7.4)
NEUTROPHILS NFR BLD AUTO: 51.2 % — SIGNIFICANT CHANGE UP (ref 43–77)
NRBC # FLD: 0 K/UL — SIGNIFICANT CHANGE UP (ref 0–0)
PLATELET # BLD AUTO: 225 K/UL — SIGNIFICANT CHANGE UP (ref 150–400)
PMV BLD: 11.5 FL — SIGNIFICANT CHANGE UP (ref 7–13)
RBC # BLD: 5.39 M/UL — SIGNIFICANT CHANGE UP (ref 4.2–5.8)
RBC # FLD: 14.7 % — HIGH (ref 10.3–14.5)
VALPROATE SERPL-MCNC: 46 UG/ML — LOW (ref 50–100)
WBC # BLD: 7.53 K/UL — SIGNIFICANT CHANGE UP (ref 3.8–10.5)
WBC # FLD AUTO: 7.53 K/UL — SIGNIFICANT CHANGE UP (ref 3.8–10.5)

## 2019-04-24 PROCEDURE — 90832 PSYTX W PT 30 MINUTES: CPT

## 2019-04-24 PROCEDURE — 99232 SBSQ HOSP IP/OBS MODERATE 35: CPT

## 2019-04-24 RX ADMIN — Medication 20 MILLIGRAM(S): at 20:56

## 2019-04-24 RX ADMIN — Medication 50 MILLIGRAM(S): at 22:16

## 2019-04-24 RX ADMIN — CLOZAPINE 350 MILLIGRAM(S): 150 TABLET, ORALLY DISINTEGRATING ORAL at 20:56

## 2019-04-24 RX ADMIN — Medication 225 MILLIGRAM(S): at 08:41

## 2019-04-24 RX ADMIN — Medication 0.5 MILLIGRAM(S): at 20:50

## 2019-04-24 RX ADMIN — RISPERIDONE 5 MILLIGRAM(S): 4 TABLET ORAL at 20:56

## 2019-04-24 RX ADMIN — DIVALPROEX SODIUM 500 MILLIGRAM(S): 500 TABLET, DELAYED RELEASE ORAL at 20:56

## 2019-04-24 RX ADMIN — Medication 20 MILLIGRAM(S): at 08:41

## 2019-04-24 RX ADMIN — DIVALPROEX SODIUM 500 MILLIGRAM(S): 500 TABLET, DELAYED RELEASE ORAL at 08:41

## 2019-04-24 RX ADMIN — Medication 0.5 MILLIGRAM(S): at 08:41

## 2019-04-25 VITALS — TEMPERATURE: 98 F

## 2019-04-25 PROCEDURE — 99238 HOSP IP/OBS DSCHRG MGMT 30/<: CPT

## 2019-04-25 RX ORDER — RISPERIDONE 4 MG/1
6 TABLET ORAL
Qty: 0 | Refills: 0 | DISCHARGE
Start: 2019-04-25 | End: 2019-05-24

## 2019-04-25 RX ORDER — RISPERIDONE 4 MG/1
1 TABLET ORAL
Qty: 30 | Refills: 0
Start: 2019-04-25 | End: 2019-05-24

## 2019-04-25 RX ORDER — CLOZAPINE 150 MG/1
7 TABLET, ORALLY DISINTEGRATING ORAL
Qty: 49 | Refills: 0
Start: 2019-04-25 | End: 2019-05-01

## 2019-04-25 RX ORDER — PROPRANOLOL HCL 160 MG
1 CAPSULE, EXTENDED RELEASE 24HR ORAL
Qty: 60 | Refills: 0
Start: 2019-04-25 | End: 2019-05-24

## 2019-04-25 RX ORDER — VENLAFAXINE HCL 75 MG
3 CAPSULE, EXT RELEASE 24 HR ORAL
Qty: 90 | Refills: 0
Start: 2019-04-25 | End: 2019-05-24

## 2019-04-25 RX ORDER — CLOZAPINE 150 MG/1
350 TABLET, ORALLY DISINTEGRATING ORAL
Qty: 0 | Refills: 0 | DISCHARGE
Start: 2019-04-25 | End: 2019-05-01

## 2019-04-25 RX ORDER — PROPRANOLOL HCL 160 MG
2 CAPSULE, EXTENDED RELEASE 24HR ORAL
Qty: 0 | Refills: 0 | DISCHARGE
Start: 2019-04-25 | End: 2019-05-24

## 2019-04-25 RX ORDER — DIVALPROEX SODIUM 500 MG/1
1 TABLET, DELAYED RELEASE ORAL
Qty: 60 | Refills: 0
Start: 2019-04-25 | End: 2019-05-24

## 2019-04-25 RX ADMIN — DIVALPROEX SODIUM 500 MILLIGRAM(S): 500 TABLET, DELAYED RELEASE ORAL at 08:53

## 2019-04-25 RX ADMIN — Medication 0.5 MILLIGRAM(S): at 08:53

## 2019-04-25 RX ADMIN — Medication 225 MILLIGRAM(S): at 08:53

## 2019-04-25 RX ADMIN — Medication 20 MILLIGRAM(S): at 08:53

## 2019-05-01 ENCOUNTER — OUTPATIENT (OUTPATIENT)
Dept: OUTPATIENT SERVICES | Facility: HOSPITAL | Age: 29
LOS: 1 days | End: 2019-05-01
Payer: COMMERCIAL

## 2019-05-01 LAB — CLOZAPINE SERPL-MCNC: SIGNIFICANT CHANGE UP

## 2019-10-30 DIAGNOSIS — Z71.89 OTHER SPECIFIED COUNSELING: ICD-10-CM

## 2020-05-01 PROCEDURE — H0002: CPT

## 2020-11-02 ENCOUNTER — OUTPATIENT (OUTPATIENT)
Dept: OUTPATIENT SERVICES | Facility: HOSPITAL | Age: 30
LOS: 1 days | Discharge: NURSING, REHAB OR LONG-TERM CARE | End: 2020-11-02

## 2020-11-03 ENCOUNTER — INPATIENT (INPATIENT)
Facility: HOSPITAL | Age: 30
LOS: 30 days | Discharge: HOME | End: 2020-12-04
Attending: PSYCHIATRY & NEUROLOGY | Admitting: PSYCHIATRY & NEUROLOGY
Payer: MEDICAID

## 2020-11-03 VITALS
RESPIRATION RATE: 16 BRPM | SYSTOLIC BLOOD PRESSURE: 112 MMHG | TEMPERATURE: 97 F | HEIGHT: 69 IN | HEART RATE: 110 BPM | DIASTOLIC BLOOD PRESSURE: 81 MMHG | OXYGEN SATURATION: 100 %

## 2020-11-03 DIAGNOSIS — F60.3 BORDERLINE PERSONALITY DISORDER: ICD-10-CM

## 2020-11-03 DIAGNOSIS — F48.9 NONPSYCHOTIC MENTAL DISORDER, UNSPECIFIED: ICD-10-CM

## 2020-11-03 LAB
ALBUMIN SERPL ELPH-MCNC: 4.3 G/DL — SIGNIFICANT CHANGE UP (ref 3.3–5)
ALP SERPL-CCNC: 80 U/L — SIGNIFICANT CHANGE UP (ref 40–120)
ALT FLD-CCNC: 19 U/L — SIGNIFICANT CHANGE UP (ref 4–41)
AMPHET UR-MCNC: NEGATIVE — SIGNIFICANT CHANGE UP
ANION GAP SERPL CALC-SCNC: 11 MMO/L — SIGNIFICANT CHANGE UP (ref 7–14)
APAP SERPL-MCNC: < 15 UG/ML — LOW (ref 15–25)
APPEARANCE UR: CLEAR — SIGNIFICANT CHANGE UP
AST SERPL-CCNC: 15 U/L — SIGNIFICANT CHANGE UP (ref 4–40)
BACTERIA # UR AUTO: NEGATIVE — SIGNIFICANT CHANGE UP
BARBITURATES UR SCN-MCNC: NEGATIVE — SIGNIFICANT CHANGE UP
BASOPHILS # BLD AUTO: 0.03 K/UL — SIGNIFICANT CHANGE UP (ref 0–0.2)
BASOPHILS NFR BLD AUTO: 0.3 % — SIGNIFICANT CHANGE UP (ref 0–2)
BENZODIAZ UR-MCNC: NEGATIVE — SIGNIFICANT CHANGE UP
BILIRUB SERPL-MCNC: 0.3 MG/DL — SIGNIFICANT CHANGE UP (ref 0.2–1.2)
BILIRUB UR-MCNC: SIGNIFICANT CHANGE UP
BLOOD UR QL VISUAL: NEGATIVE — SIGNIFICANT CHANGE UP
BUN SERPL-MCNC: 12 MG/DL — SIGNIFICANT CHANGE UP (ref 7–23)
CALCIUM SERPL-MCNC: 9.4 MG/DL — SIGNIFICANT CHANGE UP (ref 8.4–10.5)
CANNABINOIDS UR-MCNC: NEGATIVE — SIGNIFICANT CHANGE UP
CHLORIDE SERPL-SCNC: 108 MMOL/L — HIGH (ref 98–107)
CO2 SERPL-SCNC: 23 MMOL/L — SIGNIFICANT CHANGE UP (ref 22–31)
COCAINE METAB.OTHER UR-MCNC: NEGATIVE — SIGNIFICANT CHANGE UP
COLOR SPEC: YELLOW — SIGNIFICANT CHANGE UP
CREAT SERPL-MCNC: 1.22 MG/DL — SIGNIFICANT CHANGE UP (ref 0.5–1.3)
EOSINOPHIL # BLD AUTO: 0.01 K/UL — SIGNIFICANT CHANGE UP (ref 0–0.5)
EOSINOPHIL NFR BLD AUTO: 0.1 % — SIGNIFICANT CHANGE UP (ref 0–6)
ETHANOL BLD-MCNC: < 10 MG/DL — SIGNIFICANT CHANGE UP
GLUCOSE SERPL-MCNC: 118 MG/DL — HIGH (ref 70–99)
GLUCOSE UR-MCNC: NEGATIVE — SIGNIFICANT CHANGE UP
HCT VFR BLD CALC: 48 % — SIGNIFICANT CHANGE UP (ref 39–50)
HGB BLD-MCNC: 14.9 G/DL — SIGNIFICANT CHANGE UP (ref 13–17)
IMM GRANULOCYTES NFR BLD AUTO: 0.3 % — SIGNIFICANT CHANGE UP (ref 0–1.5)
KETONES UR-MCNC: SIGNIFICANT CHANGE UP
LEUKOCYTE ESTERASE UR-ACNC: NEGATIVE — SIGNIFICANT CHANGE UP
LYMPHOCYTES # BLD AUTO: 3.35 K/UL — HIGH (ref 1–3.3)
LYMPHOCYTES # BLD AUTO: 38.2 % — SIGNIFICANT CHANGE UP (ref 13–44)
MCHC RBC-ENTMCNC: 26 PG — LOW (ref 27–34)
MCHC RBC-ENTMCNC: 31 % — LOW (ref 32–36)
MCV RBC AUTO: 83.8 FL — SIGNIFICANT CHANGE UP (ref 80–100)
METHADONE UR-MCNC: NEGATIVE — SIGNIFICANT CHANGE UP
MONOCYTES # BLD AUTO: 0.57 K/UL — SIGNIFICANT CHANGE UP (ref 0–0.9)
MONOCYTES NFR BLD AUTO: 6.5 % — SIGNIFICANT CHANGE UP (ref 2–14)
NEUTROPHILS # BLD AUTO: 4.78 K/UL — SIGNIFICANT CHANGE UP (ref 1.8–7.4)
NEUTROPHILS NFR BLD AUTO: 54.6 % — SIGNIFICANT CHANGE UP (ref 43–77)
NITRITE UR-MCNC: NEGATIVE — SIGNIFICANT CHANGE UP
NRBC # FLD: 0 K/UL — SIGNIFICANT CHANGE UP (ref 0–0)
OPIATES UR-MCNC: NEGATIVE — SIGNIFICANT CHANGE UP
OXYCODONE UR-MCNC: NEGATIVE — SIGNIFICANT CHANGE UP
PCP UR-MCNC: NEGATIVE — SIGNIFICANT CHANGE UP
PH UR: 6 — SIGNIFICANT CHANGE UP (ref 5–8)
PLATELET # BLD AUTO: 203 K/UL — SIGNIFICANT CHANGE UP (ref 150–400)
PMV BLD: 11.9 FL — SIGNIFICANT CHANGE UP (ref 7–13)
POTASSIUM SERPL-MCNC: 4 MMOL/L — SIGNIFICANT CHANGE UP (ref 3.5–5.3)
POTASSIUM SERPL-SCNC: 4 MMOL/L — SIGNIFICANT CHANGE UP (ref 3.5–5.3)
PROT SERPL-MCNC: 7.3 G/DL — SIGNIFICANT CHANGE UP (ref 6–8.3)
PROT UR-MCNC: 100 — HIGH
RBC # BLD: 5.73 M/UL — SIGNIFICANT CHANGE UP (ref 4.2–5.8)
RBC # FLD: 14.5 % — SIGNIFICANT CHANGE UP (ref 10.3–14.5)
RBC CASTS # UR COMP ASSIST: SIGNIFICANT CHANGE UP (ref 0–?)
SALICYLATES SERPL-MCNC: < 5 MG/DL — LOW (ref 15–30)
SARS-COV-2 RNA SPEC QL NAA+PROBE: SIGNIFICANT CHANGE UP
SODIUM SERPL-SCNC: 142 MMOL/L — SIGNIFICANT CHANGE UP (ref 135–145)
SP GR SPEC: 1.04 — SIGNIFICANT CHANGE UP (ref 1–1.04)
SQUAMOUS # UR AUTO: SIGNIFICANT CHANGE UP
TSH SERPL-MCNC: 1.94 UIU/ML — SIGNIFICANT CHANGE UP (ref 0.27–4.2)
UROBILINOGEN FLD QL: HIGH
VALPROATE SERPL-MCNC: < 3.2 UG/ML — LOW (ref 50–100)
WBC # BLD: 8.77 K/UL — SIGNIFICANT CHANGE UP (ref 3.8–10.5)
WBC # FLD AUTO: 8.77 K/UL — SIGNIFICANT CHANGE UP (ref 3.8–10.5)
WBC UR QL: SIGNIFICANT CHANGE UP (ref 0–?)

## 2020-11-03 PROCEDURE — 99285 EMERGENCY DEPT VISIT HI MDM: CPT

## 2020-11-03 RX ORDER — CLOZAPINE 150 MG/1
350 TABLET, ORALLY DISINTEGRATING ORAL AT BEDTIME
Refills: 0 | Status: DISCONTINUED | OUTPATIENT
Start: 2020-11-03 | End: 2020-11-12

## 2020-11-03 RX ORDER — RISPERIDONE 4 MG/1
6 TABLET ORAL AT BEDTIME
Refills: 0 | Status: DISCONTINUED | OUTPATIENT
Start: 2020-11-03 | End: 2020-11-11

## 2020-11-03 RX ORDER — PERPHENAZINE 8 MG/1
8 TABLET, FILM COATED ORAL
Refills: 0 | Status: DISCONTINUED | OUTPATIENT
Start: 2020-11-03 | End: 2020-11-04

## 2020-11-03 RX ORDER — MIRTAZAPINE 45 MG/1
45 TABLET, ORALLY DISINTEGRATING ORAL AT BEDTIME
Refills: 0 | Status: DISCONTINUED | OUTPATIENT
Start: 2020-11-03 | End: 2020-11-09

## 2020-11-03 RX ORDER — DIPHENHYDRAMINE HCL 50 MG
50 CAPSULE ORAL ONCE
Refills: 0 | Status: DISCONTINUED | OUTPATIENT
Start: 2020-11-03 | End: 2020-12-04

## 2020-11-03 RX ORDER — DIPHENHYDRAMINE HCL 50 MG
50 CAPSULE ORAL EVERY 6 HOURS
Refills: 0 | Status: DISCONTINUED | OUTPATIENT
Start: 2020-11-03 | End: 2020-12-04

## 2020-11-03 RX ORDER — HALOPERIDOL DECANOATE 100 MG/ML
5 INJECTION INTRAMUSCULAR ONCE
Refills: 0 | Status: DISCONTINUED | OUTPATIENT
Start: 2020-11-03 | End: 2020-12-04

## 2020-11-03 RX ORDER — PERPHENAZINE 8 MG/1
5 TABLET, FILM COATED ORAL EVERY 8 HOURS
Refills: 0 | Status: DISCONTINUED | OUTPATIENT
Start: 2020-11-03 | End: 2020-11-30

## 2020-11-03 RX ADMIN — CLOZAPINE 350 MILLIGRAM(S): 150 TABLET, ORALLY DISINTEGRATING ORAL at 22:16

## 2020-11-03 RX ADMIN — PERPHENAZINE 8 MILLIGRAM(S): 8 TABLET, FILM COATED ORAL at 22:16

## 2020-11-03 RX ADMIN — RISPERIDONE 6 MILLIGRAM(S): 4 TABLET ORAL at 22:16

## 2020-11-03 RX ADMIN — MIRTAZAPINE 45 MILLIGRAM(S): 45 TABLET, ORALLY DISINTEGRATING ORAL at 22:16

## 2020-11-03 RX ADMIN — Medication 1 MILLIGRAM(S): at 22:16

## 2020-11-03 NOTE — ED BEHAVIORAL HEALTH ASSESSMENT NOTE - DESCRIPTION
During course of ED visit patient was calm and cooperative. Patient was not aggressive or violent and did not require PRN medications.    Vital Signs Last 24 Hrs  T(C): 36.2 (03 Nov 2020 14:31), Max: 36.2 (03 Nov 2020 14:31)  T(F): 97.2 (03 Nov 2020 14:31), Max: 97.2 (03 Nov 2020 14:31)  HR: 110 (03 Nov 2020 14:31) (110 - 110)  BP: 112/81 (03 Nov 2020 14:31) (112/81 - 112/81)  BP(mean): --  RR: 16 (03 Nov 2020 14:31) (16 - 16)  SpO2: 100% (03 Nov 2020 14:31) (100% - 100%) clozaril induced tachycardia, sinus pericranii see hpi clozaril induced tachycardia/sz, sinus pericranii

## 2020-11-03 NOTE — ED BEHAVIORAL HEALTH NOTE - BEHAVIORAL HEALTH NOTE
Writer spoke with A JOSE Pham for bed assignment. Pt assigned to bed on unit [facility]. Writer spoke with RADHA Pham for bed assignment. Pt assigned to bed on unit [facility].    Writer spoke with pt’s mother Roya at [phone] informing her of pt's pending admission to [facility]. Contact information provided. The following medication list was confirmed by mother:     Propanolol 40 mg 1 tab BID   Perphenazine 8mg 1 tab BID  Clozapine 100mg 3 tabs QHS  Mirtazapine 45mg 1 tab QHS  Risperidone 3 mg 2 tabs QHS  Lorazepam 1mg 2x as day as needed

## 2020-11-03 NOTE — ED PROVIDER NOTE - PMH
Anxiety    Borderline personality disorder    Depression    Psychosis    Schizo affective schizophrenia

## 2020-11-03 NOTE — ED BEHAVIORAL HEALTH NOTE - BEHAVIORAL HEALTH NOTE
Spoke with Yg Mullen ([phone])- Had new admission to [facility] to come from outpatient. He has schizoaffective disorder with multiple hospitalizations and hx of 2 past SA. Last in 2010 tried to strangle himself and 2018 drank mouth wash. Multiple hospitalizations – last in April 2019. Patient has worsening anxiety, paranoia and worsening depression. Active SI to slit his wrists, cut his throat and strangle himself. He is paranoid and thinks cameras are watching him and people can read his mind. + VH of white and  woman going to school. He finds them distressful and can’t concentrate. He appears on the spectrum. He is unable to safety plan because he is too paranoid to trust people. He is inconsistent in his report. No PMH. No surgeries. + hx of Clozapine induced seizures if his clozapine was over 600-800mg.     Inderal 20mg BID  Perphenazine 8mg QAM  Ativan 1mg QAM & Ativan 1mg PRN QD  Remeron 45mg QHS  Clozapine 300mg QHS  Risperdal 6mg QHS    He does not know why patient is taking 3 antipsychotics. Patient goes to [facility]. Referred by MD Hollis Spoke with Yg Mullen ([phone])- Had new admission to [facility] to come from outpatient. He has schizoaffective disorder with multiple hospitalizations and hx of 2 past SA. Last in 2010 tried to strangle himself and 2018 drank mouth wash. Multiple hospitalizations – last in April 2019. Patient has worsening anxiety, paranoia and worsening depression. Active SI to slit his wrists, cut his throat and strangle himself. He is paranoid and thinks cameras are watching him and people can read his mind. + VH of white and  woman going to school. He finds them distressful and can’t concentrate. He appears on the spectrum. He is unable to safety plan because he is too paranoid to trust people. He is inconsistent in his report. No PMH. No surgeries. + hx of Clozapine induced seizures if his clozapine was over 600-800mg.     Inderal 20mg BID  Perphenazine 8mg QAM  Ativan 1mg QAM & Ativan 1mg PRN QD  Remeron 45mg QHS  Clozapine 300mg QHS  Risperdal 6mg QHS    He does not know why patient is taking 3 antipsychotics. Patient goes to [facility]. Referred by MD Hollis    Spoke with Dr. Giron- She just started treatment with patient and he said similarly scary things. What made us nervous was the fact that he was looking up top 10 ways to kill himself but he didn’t because he tried all these ways again. It was hard to involuntary hospitalize him because he was willing to go to [facility]. Patient tends to exaggerate suicidality because when he says it people listen. Per his last therapist transfer of care was probably very hard for him. He was in [facility] and in therapy but that stopped because of COVID. He has a hx of cutting but that is remote. Patient has some sort of cognitive issues and has trouble telling a coherent story and being clear about time line. Last week he was able to say he does not want to die and state future plans. Patient is able to engage in safety planning. Spoke with Yg Mullen ([phone])- Had new admission to [facility] to come from outpatient. He has schizoaffective disorder with multiple hospitalizations and hx of 2 past SA. Last in 2010 tried to strangle himself and 2018 drank mouth wash. Multiple hospitalizations – last in April 2019. Patient has worsening anxiety, paranoia and worsening depression. Active SI to slit his wrists, cut his throat and strangle himself. He is paranoid and thinks cameras are watching him and people can read his mind. + VH of white and  woman going to school. He finds them distressful and can’t concentrate. He appears on the spectrum. He is unable to safety plan because he is too paranoid to trust people. He is inconsistent in his report. No PMH. No surgeries. + hx of Clozapine induced seizures if his clozapine was over 600-800mg.     Inderal 20mg BID  Perphenazine 8mg QAM  Ativan 1mg QAM & Ativan 1mg PRN QD  Remeron 45mg QHS  Clozapine 300mg QHS  Risperdal 6mg QHS    He does not know why patient is taking 3 antipsychotics. Patient goes to [facility]. Referred by MD Hollis    Spoke with Dr. Giron- She just started treatment with patient and he said similarly scary things. What made us nervous was the fact that he was looking up top 10 ways to kill himself but he didn’t because he tried all these ways again. It was hard to involuntary hospitalize him because he was willing to go to [facility]. Patient tends to exaggerate suicidality because when he says it people listen. Per his last therapist transfer of care was probably very hard for him. He was in [facility] and in therapy but that stopped because of COVID. He has a hx of cutting but that is remote. Patient has some sort of cognitive issues and has trouble telling a coherent story and being clear about time line. Last week he was able to say he does not want to die and state future plans. Patient is able to engage in safety planning. She reports if mother is concerned she would be concerned.     Spoke with Mother Roya ([phone])- She reports patient always endorses suicidal ideation. She reports the last 2 weeks there has not been much difference. She reports the patient is paranoid and delusional and these are chronic. She reports the patient seems the same. She denies any worsening in symptoms. Mother believes that patient told SW a few weeks that he was researching ways to kill himself. She did not see patient researching anything on his ipad. She went through his history. She stated that she thinks patient says things like this because he likes' the attention and thinks if patient doesn't say these things then "he won't have any attention." He endorsed suicidal ideation to strangle himself last Tuesday. She does think patient has been more depressed the last few weeks but she does not think he needs to be in the hospital. She reports she has been in every hospital around [place] and "it is the same thing." Spoke with Yg Mullen ([phone])- Had new admission to [facility] to come from outpatient. He has schizoaffective disorder with multiple hospitalizations and hx of 2 past SA. Last in 2010 tried to strangle himself and 2018 drank mouth wash. Multiple hospitalizations – last in April 2019. Patient has worsening anxiety, paranoia and worsening depression. Active SI to slit his wrists, cut his throat and strangle himself. He is paranoid and thinks cameras are watching him and people can read his mind. + VH of white and  woman going to school. He finds them distressful and can’t concentrate. He appears on the spectrum. He is unable to safety plan because he is too paranoid to trust people. He is inconsistent in his report. No PMH. No surgeries. + hx of Clozapine induced seizures if his clozapine was over 600-800mg.     Inderal 20mg BID  Perphenazine 8mg QAM  Ativan 1mg QAM & Ativan 1mg PRN QD  Remeron 45mg QHS  Clozapine 300mg QHS  Risperdal 6mg QHS    He does not know why patient is taking 3 antipsychotics. Patient goes to [facility]. Referred by MD Hollis    Spoke with Dr. Giron- She just started treatment with patient and he said similarly scary things. What made us nervous was the fact that he was looking up top 10 ways to kill himself but he didn’t because he tried all these ways again. It was hard to involuntary hospitalize him because he was willing to go to [facility]. Patient tends to exaggerate suicidality because when he says it people listen. Per his last therapist transfer of care was probably very hard for him. He was in [facility] and in therapy but that stopped because of COVID. He has a hx of cutting but that is remote. Patient has some sort of cognitive issues and has trouble telling a coherent story and being clear about time line. Last week he was able to say he does not want to die and state future plans. Patient is able to engage in safety planning. She reports if mother is concerned she would be concerned.     Spoke with Mother Roya ([phone])- She reports patient always endorses suicidal ideation. She reports the last 2 weeks there has not been much difference. She reports the patient is paranoid and delusional and these are chronic. She reports the patient seems the same. She denies any worsening in symptoms. Mother believes that patient told SW a few weeks that he was researching ways to kill himself. She did not see patient researching anything on his ipad. She went through his history. She stated that she thinks patient says things like this because he likes' the attention and thinks if patient doesn't say these things then "he won't have any attention." He endorsed suicidal ideation to strangle himself last Tuesday. She does think patient has been more depressed the last few weeks but she does not think he needs to be in the hospital. She reports she has been in every hospital around [place] and "it is the same thing."    Spoke with Dr. Hoffman- Patient is always chronically suicidal, chronically depressed and chronically anxious. Patient has MR and always dresses in black. Patient has chronic active SI but no intent or plan. Patient is limited intellectually. He has never hospitalized. Patient is chronically psychotic but is not thought disordered. He has fixed VH of south  women going to school with their bags and paranoia. Spoke with Yg Mullen ([phone])- Had new admission to [facility] to come from outpatient. He has schizoaffective disorder with multiple hospitalizations and hx of 2 past SA. Last in 2010 tried to strangle himself and 2018 drank mouth wash. Multiple hospitalizations – last in April 2019. Patient has worsening anxiety, paranoia and worsening depression. Active SI to slit his wrists, cut his throat and strangle himself. He is paranoid and thinks cameras are watching him and people can read his mind. + VH of white and  woman going to school. He finds them distressful and can’t concentrate. He appears on the spectrum. He is unable to safety plan because he is too paranoid to trust people. He is inconsistent in his report. No PMH. No surgeries. + hx of Clozapine induced seizures if his clozapine was over 600-800mg.     Inderal 20mg BID  Perphenazine 8mg QAM  Ativan 1mg QAM & Ativan 1mg PRN QD  Remeron 45mg QHS  Clozapine 300mg QHS  Risperdal 6mg QHS    He does not know why patient is taking 3 antipsychotics. Patient goes to [facility]. Referred by MD Hollis    Spoke with Dr. Giron- She just started treatment with patient and he said similarly scary things. What made the clinic nervous was the fact that he was looking up top 10 ways to kill himself but he didn’t because he tried all these ways before. It was hard to involuntary hospitalize him because he was willing to go to [facility]. Patient tends to exaggerate suicidality because when he says it, people listen. Per his last therapist transfer of care was probably very hard for him. He was in [facility] and in therapy but that stopped because of COVID. He has a hx of cutting but that is remote. Patient has some sort of cognitive issues and has trouble telling a coherent story and being clear about time line. Last week he was able to say he does not want to die and state future plans. Patient was able to engage in safety planning. She reports if mother is concerned she would be concerned.     Spoke with Mother Roya ([phone])- She reports patient always endorses suicidal ideation. She reports the last 2 weeks there has not been much difference. She reports the patient is paranoid and delusional and these are chronic. She reports the patient seems the same but does seem more depressed. She denies any worsening in symptoms. Mother believes that patient told SW a few weeks that he was researching ways to kill himself. She did not see patient researching anything on his ipad.  She stated that she thinks patient says things like this because he likes' the attention. He endorsed suicidal ideation to strangle himself last Tuesday. She does think patient has been more depressed the last few weeks. She stated he needs less medication or new medication.     Spoke with Dr. Hoffman- Patient is always chronically suicidal, chronically depressed and chronically anxious. Patient has MR and always dresses in black. Patient has chronic active SI but no intent or plan. If he has a plan- this is different. Patient is limited intellectually. He has never hospitalized him. Patient is chronically psychotic but is not thought disordered. He has fixed VH of south  women going to school with their bags and paranoia.

## 2020-11-03 NOTE — ED BEHAVIORAL HEALTH NOTE - BEHAVIORAL HEALTH NOTE
Pt is a 31 y/o male with c/o suicidal ideation and visual hallucinations over past few months Pt. states he's been compliant with meds. h/o schizoaffective, borderline personality d/o, anxiety, depression. Writer spoke with pt’s mother Roya [phone] for collateral information.    Mother states that she brought the pt. to the ED because for suicidal ideation. She stated that he pt. has been having “suicidal tendencies” for the past two weeks. Reportedly, the pt. expressed wanting to strangle himself. She stated that the pt. has tried to “cut” his hands when he was 15. Mother stated that the pt. is paranoid, delusional and often speaks of “imaginary things.” She stated that the pt. is complaint with his psychotropic medication however, it is her belief that the pt. medications are not effective since the pt. is not improving. Pt has had several inpatient admissions. He was last admitted on low 6 two years ago. She informed that the pt. follows up at [facility]. No reported substance usage. No complaint of sleep disturbances or changes in appetite. SW to follow.

## 2020-11-03 NOTE — ED BEHAVIORAL HEALTH ASSESSMENT NOTE - CASE SUMMARY
Patient is a 30 year old male, single, domiciled (w/mother), non-caregiver, unemployed with dx of Schizoaffective disorder. Patient has a hx of multiple psychiatric hospitalizations ([facility] [place], [facility], [facility]); last [facility] Low 6 (4/2019) in the context of worsening psychosis and suicidality.  Patient has a hx of 2 suicide attempts (1st 2010 strangle self with a belt, 2018 drank mouthwash; per chart 4 SA and mother unaware, not seeking psychiatric hospitalization and did not require medical hospitalization). Patient has a hx in outpatient psychiatric treatment at [facility] with Dr. Hoffman; most recently seen 2x with  Dr. Hollis (recommended PHP).   Patient has a hx of self-injury via cutting (10x); last 2018.  Per chart, hx of aggression  towards mom x1. No legal issues.  No hx of substance abuse/dependence.  PMH is significant for seizure activity secondary to clozapine (greater 600mg), hx of Sinus Pericranii, Frontal lobe cyst, Frontal nodule noted on MRI brain 2016. BIB mother referred by PHP for worsening symptoms.     Patient endorses worsening depression, paranoia and suicidal ideation. He has had recent SI with thoughts to strangle himself and recently been researching ways to kill himself. Patient was referred to PHP for these worsening symptoms and he continued to endorse active suicidal ideation and was unable to safety plan. Although patient denies current active suicidal ideation and has chronic residual psychotic symptoms he appears to be decompensating ie: recent researching ways to kill himself, worsening paranoia, SI with plan for strangulation. Patient is not at baseline and would recommend inpatient hospitalization for safety and medication stabilization.

## 2020-11-03 NOTE — ED BEHAVIORAL HEALTH ASSESSMENT NOTE - OTHER PAST PSYCHIATRIC HISTORY (INCLUDE DETAILS REGARDING ONSET, COURSE OF ILLNESS, INPATIENT/OUTPATIENT TREATMENT)
patient has extensive psychiatric history including multiple failed medication trials (see below) and minimal response to ECT (22 treatment). Last at [facility] in 2019 and d/c to [facility] 2/13 x 1 year.     Patient psychiatrist is Dr. Giron. Previously in group therapy at [facility]

## 2020-11-03 NOTE — ED BEHAVIORAL HEALTH ASSESSMENT NOTE - DETAILS
see hpi lithium- vomiting, seizures from clozaril dose over 600mg CINDI self; mother mother/thearpist lithium- vomiting, seizures from clozaril dose over 600mg, depakote- nausea CINDI Leroy

## 2020-11-03 NOTE — ED BEHAVIORAL HEALTH ASSESSMENT NOTE - HPI (INCLUDE ILLNESS QUALITY, SEVERITY, DURATION, TIMING, CONTEXT, MODIFYING FACTORS, ASSOCIATED SIGNS AND SYMPTOMS)
Patient is a 30 year old male, single, domiciled (w/mother), non-caregiver, unemployed with dx of Schizoaffective disorder. Patient has a hx of multiple psychiatric hospitalizations ([facility] , [place], [place], [facility]); last [facility] Low 6 (4/2019) in the context of worsening psychosis and suicidality.  Patient has a hx of 2 suicide attempts (1st 2010 strangle self with a belt, 2018 drank mouthwash; per chart 4 SA and mother unaware, not seeking psychiatric hospitalization and did not require medical hospitalization). Patient has a hx in outpatient psychiatric treatment at [facility] with Dr. Hoffman; most recently seen 2x with  Dr. Hollis (recommended PHP).   Patient has a hx of self-injury via cutting (10x); last 2018.  Per chart, hx of aggression  towards mom x1. No legal issues.  No hx of substance abuse/dependence.  PMH is significant for seizure activity secondary to clozapine (greater 600mg), hx of Sinus Pericranii, Frontal lobe cyst, Frontal nodule noted on MRI brain 2016. BIB mother referred by PHP for worsening symptoms.     Pt was referred to [facility] by Dr. Hollis, who felt that he could benefit from treatment in a structured and supportive environment where his symptoms and medications can be monitored closely. He was referred to ED by NP Yg Mullen for worsening psychosis, depression and active SI.     Patient reports he feels like his medication isn't working anymore. He stated despite taking medication he is still distressed over his symptoms. He endorses continued VH of  woman going to school and paranoia that his house is being watched. He stated his paranoia is worse in that "it is more intense." He reports he also has chronic suicidal ideation but "it is getting worse and worse everyday." He stated he researched ways to kill himself a few weeks ago but did not act on them because "I had no motivation." He reports he has been thinking more about strangling himself but has no intent to act on it. He reports chronic issues with depression and stated he never has relief from his symptoms. He reports current passive suicidal ideation "I want to pass on."     Patient stated he is not happy with his medication regimen. He reports he is on too many medication and that they are not helping him. He endorses feelings of hopelessness.    Patient denies thought insertion/withdrawal & denies referential thought processes Patient does not report nor exhibit any signs of maryuri, including irritable or elevated mood, grandiosity, pressured speech, risk-taking behaviors, increase in productivity or agitation. Patient denies any HI, violent thoughts.     See  note for collateral. Patient is a 30 year old male, single, domiciled (w/mother), non-caregiver, unemployed with dx of Schizoaffective disorder. Patient has a hx of multiple psychiatric hospitalizations ([facility] , [place], [place], [facility]); last [facility] Low 6 (4/2019) in the context of worsening psychosis and suicidality.  Patient has a hx of 2 suicide attempts (1st 2010 strangle self with a belt, 2018 drank mouthwash; per chart 4 SA and mother unaware, not seeking psychiatric hospitalization and did not require medical hospitalization). Patient has a hx in outpatient psychiatric treatment at [facility] with Dr. Hoffman; most recently seen 2x with  Dr. Hollis (recommended PHP).   Patient has a hx of self-injury via cutting (10x); last 2018.  Per chart, hx of aggression  towards mom x1. No legal issues.  No hx of substance abuse/dependence.  PMH is significant for seizure activity secondary to clozapine (greater 600mg), hx of Sinus Pericranii, Frontal lobe cyst, Frontal nodule noted on MRI brain 2016. BIB mother referred by PHP for worsening symptoms.     Pt was referred to [facility] by Dr. Hollis, who felt that he could benefit from treatment in a structured and supportive environment where his symptoms and medications can be monitored closely. He was referred to ED by IDALIA Mullen for worsening psychosis, depression and active SI.     Patient reports he feels like his medication isn't working anymore. He stated despite taking medication he is still distressed over his symptoms. He endorses continued VH of  woman going to school and paranoia that his house is being watched. He stated his paranoia is worse in that "it is more intense." He reports he also has chronic suicidal ideation but "it is getting worse and worse everyday." He stated he researched ways to kill himself a few weeks ago but did not act on them because "I had no motivation." He reports he has been thinking more about strangling himself but has no intent to act on it. He reports chronic issues with depression and stated he never has relief from his symptoms. He reports current passive suicidal ideation "I want to pass on."     Patient stated he is not happy with his medication regimen. He reports he is on too many medication and that they are not helping him and he is having side effects (tremor/EPS). He endorses feelings of hopelessness.    Patient denies thought insertion/withdrawal & denies referential thought processes Patient does not report nor exhibit any signs of maryuri, including irritable or elevated mood, grandiosity, pressured speech, risk-taking behaviors, increase in productivity or agitation. Patient denies any HI, violent thoughts.     See  note for collateral.    Spoek with Mother Roya- Confirmed patient is on Clozaril 350mg QHS. They stopped Depakote many months ago due to nausea from medication.

## 2020-11-03 NOTE — ED BEHAVIORAL HEALTH ASSESSMENT NOTE - PAST PSYCHOTROPIC MEDICATION
Lithium, Lamictal, Celexa, Saphris, Cymbalta, Risperdal, Pristiq, Abilify, Saphris, Wellbutrin XL, Atenolol, Haldol, Ativan, Zolpidem, Cytomel, Prolixin, Zyprexa, Klonopin, Depakote

## 2020-11-03 NOTE — ED BEHAVIORAL HEALTH ASSESSMENT NOTE - CURRENT MEDICATION
Ativan 1mg PRN BID, Propanolol 40 mg 1 tab BID, Perphenazine 8mg 1 tab BID  Clozapine 350mg QHS, Mirtazapine 45mg 1 tab QHS, Risperidone 6 mg tabs QHS,     Called Mother- Confirmed patient on 350mg Clozaril Ativan 1mg PRN BID (Reference #: [id number] ), Propanolol 40 mg 1 tab BID, Perphenazine 8mg 1 tab BID, Clozapine 350mg QHS, Mirtazapine 45mg 1 tab QHS, Risperidone 6 mg tabs QHS    Called Mother- Confirmed patient on 350mg Clozaril- no longer taking Depakote x many months due to nausea

## 2020-11-03 NOTE — ED ADULT NURSE NOTE - CHIEF COMPLAINT QUOTE
Pt. c/o suicidal ideation and visual hallucinations over past few months. Denies any plan or HI. Denies alcohol or drug use. States he's been compliant with meds. h/o schizoaffective, borderline personality d/o, anxiety, depression.  IDALIA herrera.   Mom (Roya): [phone]

## 2020-11-03 NOTE — ED BEHAVIORAL HEALTH ASSESSMENT NOTE - SUMMARY
Patient is a 30 year old male, single, domiciled (w/mother), non-caregiver, unemployed with dx of Schizoaffective disorder. Patient has a hx of multiple psychiatric hospitalizations ([facility] , [place], [facility], [place], [facility]); last [facility] (4/2019) in the context of worsening psychosis and suicidality.  Patient has a hx of 2 suicide attempts (1st 2010 strangle self with a belt, 2018 drank mouthwash; per chart 4 SA and mother unaware, not seeking psychiatric hospitalization and did not require medical hospitalization). Patient has a hx in outpatient psychiatric treatment at [facility] with Dr. Hoffman; most recently seen 2x with  Dr. Hollis (recommended PHP).   Patient has a hx of self-injury via cutting (10x); last 2018.  Per chart, hx of aggression  towards mom x1. No legal issues.  No hx of substance abuse/dependence.  PMH is significant for seizure activity secondary to clozapine (greater 600mg), hx of Sinus Pericranii, Frontal lobe cyst, Frontal nodule noted on MRI brain 2016. BIB mother referred by PHP for worsening symptoms.     Patient endorses worsening depression, paranoia and suicidal ideation. He has had recent SI with thoughts to strangle himself and recently been researching ways to kill himself. Patient was referred to PHP for these worsening symptoms and he continued to endorse active suicidal ideation and was unable to safety plan. Although patient denies current active suicidal ideation and has chronic residual psychotic symptoms he appears to be decompensating ie: recent researching ways to kill himself, worsening paranoia, SI with plan for strangulation. Patient is not at baseline and would recommend inpatient hospitalization for safety and medication stabilization.     Would recommend based on current redundancy of medication regimen to consider maximizing Clozaril- order clozaril level-  and d/c polypharmacy.     Patient denies current suicidal ideation. He denies HI. Patient is not aggressive or violent. Patient agreed verbally could alert staff if had worsening symptoms or urges to harm self or others. No 1:1 needed.

## 2020-11-03 NOTE — ED BEHAVIORAL HEALTH NOTE - BEHAVIORAL HEALTH NOTE
COVID Exposure Screen- Patient    1.	*In the past 14 days, have you been around anyone with a positive COVID-19 test?*   (  ) Yes   (x ) No   (  ) Unknown- Reason (e.g. patient uncertain, sedated, refusing to answer, etc.):  ______  IF YES PROCEED TO QUESTION #2. IF NO or UNKNOWN, PLEASE SKIP TO QUESTION #7  2.	Were you within 6 feet of them for at least 15 minutes? (  ) Yes   (  ) No   (  ) Unknown- Reason: ______    3.	Have you provided care for them? (  ) Yes   (  ) No   (  ) Unknown- Reason: ______    4.	Have you had direct physical contact with them (touched, hugged, or kissed them)? (  ) Yes   (  ) No    (  ) Unknown- Reason: ______    5.	Have you shared eating or drinking utensils with them? (  ) Yes   (  ) No    (  ) Unknown- Reason: ______    6.	Have they sneezed, coughed, or somehow got respiratory droplets on you? (  ) Yes   (  ) No    (  ) Unknown- Reason: ______    7.	*Have you been out of [place] within the past 14 days?*  (  ) Yes   (x  ) No   (  ) Unknown- Reason (e.g. patient uncertain, sedated, refusing to answer, etc.): _______  IF YES PLEASE ANSWER THE FOLLOWING QUESTIONS:  8.	Which state/country have you been to? ______   9.	Were you there over 24 hours? (  ) Yes   (  ) No    (  ) Unknown- Reason: ______    10.	What date did you return to [facility]? ______

## 2020-11-03 NOTE — ED BEHAVIORAL HEALTH ASSESSMENT NOTE - SUICIDE RISK FACTORS
Current mood episode/Psychotic disorder current/past/Mood Disorder current/past/Cluster B Personality disorders or traits current/past/Recent onset of current/past psychiatric diagnosis/Impulsivity/Hopelessness or despair Impulsivity/Hopelessness or despair/Recent onset of current/past psychiatric diagnosis/Current mood episode/Mood Disorder current/past/Psychotic disorder current/past

## 2020-11-03 NOTE — ED ADULT TRIAGE NOTE - CHIEF COMPLAINT QUOTE
Pt. c/o suicidal ideation and visual hallucinations over past few months. Denies any plan or HI. Denies alcohol or drug use. States he's been compliant with meds. h/o schizoaffective, borderline personality d/o, anxiety, depression.  IDALIA herrera.   Mom (Roya): [phone]

## 2020-11-03 NOTE — ED BEHAVIORAL HEALTH NOTE - BEHAVIORAL HEALTH NOTE
Assessment/Plan:    Severe episode of recurrent major depressive disorder, without psychotic features ([facility] [address] )  PHQ score remains elevated at 24 on daily lexapro as rx'd per psych  Advise she maintain close f/u as scheduled (next appt next week) and discuss other alternative med options  Continue monthly as planned (cannot be more frequent due to financial strain)  Will defer ongoing management to psych now that she is established  Generalized anxiety disorder  BARBARA score remains elevated at 21 on daily escitalopram    Maintain psych and therapy follow up as scheduled  Opiate abuse, continuous ([facility] [address] )  Continue suboxone management per psych  Diagnoses and all orders for this visit:    Generalized anxiety disorder    Severe episode of recurrent major depressive disorder, without psychotic features ([facility] [address] )    Opiate abuse, continuous ([facility] [address] )    Other orders  -     buprenorphine-naloxone (SUBOXONE) 8-2 mg per SL tablet; Place 1 Tablet By Sublingual Route 2 time per day allow to dissolve slowly in mouth without chewing or swallowing       Order given to schedule mammogram  Reminded her to schedule gyn exam   Flu shot declined  She has fit kit at home to complete for colon screening  Subjective:      Patient ID: Valentino Russo is a 64 y o  female  Went inpatient to [facility] for 20+ days and is following up with them outpatient  Was started on suboxone while there  Seeing a counselor once a month at [facility]  Still feeling pretty low at times along with anxiety  No thoughts of suicide or self-harm  Takes the lexapro and subxone but does not always take the seroquel  Interested in getting blood work done, feels as though it hasn't been done in a long time  Feels as though the lexapro is not working as well as she would like  Feels too anxious to leave the house at times    Only stressor that she can think of is that it is coming up on 2 years of her parents passing and the holidays Call received from Dr. Giron outpatient provider at [facility]. MD provided the following information:     Pt lives with mother. Pt with hx of mental illness. Pt has hx of multiple past hospitalizations. Last reported to be 2019 at [facility]. Pt started [facility] Partial program today. Pt at program was reported to have been actively suicidal and unable to engage in safety plan. MD reports pt previous provider to be Dr. Hart. Pt reported to be persistently suicidal with some concerning comments at tele appointment last week. Pt reported researching top 10 ways to commit suicide. Pt said to have tried multiple different ways in past. Pt with cognitive impairment and personality component making assessment of pt often difficult. Mother has verbalized past suicidal gestures but not lethal attempts in past. One known severe attempt by unknown way at 16 years old. Several other gestures with last known last year, in which pt choked self with belt, not hanging, just tried to pull tight stopping with pain. Unclear whether SI passive, active or exaggerated with pt responding selectively or difficult to assess. Most recent statements found to be past SI (2 weeks ago) with further elaboration in session.  Voluntary admission offered to pt last week. Pt with no active SI or plan at time of last outpatient session so did not meet criteria for involuntary admission. Pt however did accept referral to partial program made by MD. Most updated medication list found on CVM with no recent changes. Pt compliant with medication. are coming up  The following portions of the patient's history were reviewed and updated as appropriate: allergies, current medications, past family history, past medical history, past social history, past surgical history and problem list     Review of Systems   Constitutional: Positive for fatigue  Negative for activity change, appetite change and unexpected weight change  Psychiatric/Behavioral: Positive for agitation, behavioral problems, confusion, decreased concentration, dysphoric mood and sleep disturbance (hard time staying asleep)  Negative for hallucinations, self-injury and suicidal ideas  The patient is nervous/anxious  The patient is not hyperactive  Objective:      /88 (Patient Position: Sitting, Cuff Size: Standard)   Pulse 84   Temp 97 5 °F (36 4 °C) (Tympanic)   Ht 5' 5 5" (1 664 m)   Wt 75 1 kg (165 lb 9 6 oz)   SpO2 99%   BMI 27 14 kg/m²          Physical Exam   Constitutional: She is oriented to person, place, and time  She appears well-developed and well-nourished  No distress  HENT:   Head: Normocephalic and atraumatic  Neck: No thyromegaly present  Cardiovascular: Normal rate, regular rhythm and normal heart sounds  Exam reveals no gallop and no friction rub  No murmur heard  Pulmonary/Chest: Effort normal and breath sounds normal  No stridor  No respiratory distress  She has no wheezes  She has no rales  Neurological: She is alert and oriented to person, place, and time  Skin: Skin is warm and dry  She is not diaphoretic  Psychiatric: Her speech is normal and behavior is normal  Judgment and thought content normal  She exhibits a depressed mood  Tearful at times through visit  Vitals reviewed

## 2020-11-03 NOTE — ED PROVIDER NOTE - CARE PLAN
Principal Discharge DX:	Borderline personality disorder  Secondary Diagnosis:	Deferred condition on axis II

## 2020-11-03 NOTE — ED PROVIDER NOTE - CLINICAL SUMMARY MEDICAL DECISION MAKING FREE TEXT BOX
This is a 30 yr old pmh schizoaffective, borderline personality disorder with c/o anxiety, depression and SI with active plan. PTA hands off report given by IDALIA Mullen. Pt expressed active SI at the office.   Upon arrival pt calm and cooperative with care. He endorsees previous psychiatric admissions and si attempt. He reports wanted to strangle himself couple of wks ago, but did not faint and reported to Crisis hotline.     Labs, psych consult

## 2020-11-03 NOTE — ED BEHAVIORAL HEALTH ASSESSMENT NOTE - PSYCHIATRIC ISSUES AND PLAN (INCLUDE STANDING AND PRN MEDICATION)
Propanolol 40 mg 1 tab BID, Perphenazine 8mg 1 tab BID, Clozapine 350mg QHS, Mirtazapine 45mg 1 tab QHS, Risperidone 6 mg tabs QHS, Perphenazine 5mg PRN, Haldol 5mg IM PRN, Ativan 1mg PRN, Ativan 2mg IM PRN, Benadryl 50mg PO/IM PRN.

## 2020-11-03 NOTE — ED BEHAVIORAL HEALTH ASSESSMENT NOTE - RISK ASSESSMENT
patient has worsening depression, sI & paranoia. He has chronic residual symptoms of psychosis and is chronically mentally ill. He engaged recent preparatory acts and has a history of suicide attempts with multiple inpatient admissions.     protective factors- supportive family, no maryuri, outpatient treaters, treatment seeking, no substance abuse, no trauma and cooperative throughout evaluation Moderate Acute Suicide Risk patient has worsening depression, sI & paranoia. He has chronic residual symptoms of psychosis and is chronically mentally ill. He engaged recent preparatory acts and has a history of suicide attempts with multiple inpatient admissions.     protective factors- supportive family, no maryuri, outpatient treaters, treatment seeking, no substance abuse, no current active suicidal ideation, no trauma and cooperative throughout evaluation

## 2020-11-03 NOTE — ED PROVIDER NOTE - OBJECTIVE STATEMENT
This is a 30 yr old pmh schizoaffective, borderline personality disorder with c/o anxiety, depression and SI with active plan. PTA hands off report given by IDALIA Mullen. Pt expressed active SI at the office.   Upon arrival pt calm and cooperative with care. He endorsees previous psychiatric admissions and si attempt. He reports wanted to strangle himself couple of wks ago, but did not faint and reported to Crisis hotline. Pt states he is battling with SI for a long time, but unfortunately despite of meds and therapy does not get any better.

## 2020-11-03 NOTE — ED ADULT NURSE NOTE - OBJECTIVE STATEMENT
receive dpt in hallway A and XO 4  in NAD resting comfortably, calm and cooperative, pt  reports HS thoughts of hurting self, no plan, reports has thoughts for " years"  but rarely acts on it, states attempted to hang self 2 month ago, did not report the incident, pt denies HI, or AVH,  answers to questions appropriately, stays on topic during conversation.

## 2020-11-04 LAB
SARS-COV-2 IGG SERPL QL IA: NEGATIVE — SIGNIFICANT CHANGE UP
SARS-COV-2 IGM SERPL IA-ACNC: <0.1 INDEX — SIGNIFICANT CHANGE UP

## 2020-11-04 PROCEDURE — 99222 1ST HOSP IP/OBS MODERATE 55: CPT

## 2020-11-04 RX ORDER — PERPHENAZINE 8 MG/1
4 TABLET, FILM COATED ORAL
Refills: 0 | Status: DISCONTINUED | OUTPATIENT
Start: 2020-11-04 | End: 2020-11-06

## 2020-11-04 RX ADMIN — CLOZAPINE 350 MILLIGRAM(S): 150 TABLET, ORALLY DISINTEGRATING ORAL at 20:42

## 2020-11-04 RX ADMIN — Medication 1 MILLIGRAM(S): at 16:36

## 2020-11-04 RX ADMIN — PERPHENAZINE 4 MILLIGRAM(S): 8 TABLET, FILM COATED ORAL at 20:41

## 2020-11-04 RX ADMIN — PERPHENAZINE 8 MILLIGRAM(S): 8 TABLET, FILM COATED ORAL at 08:50

## 2020-11-04 RX ADMIN — MIRTAZAPINE 45 MILLIGRAM(S): 45 TABLET, ORALLY DISINTEGRATING ORAL at 20:42

## 2020-11-04 RX ADMIN — RISPERIDONE 6 MILLIGRAM(S): 4 TABLET ORAL at 20:41

## 2020-11-05 PROCEDURE — 99232 SBSQ HOSP IP/OBS MODERATE 35: CPT

## 2020-11-05 RX ORDER — LAMOTRIGINE 25 MG/1
25 TABLET, ORALLY DISINTEGRATING ORAL DAILY
Refills: 0 | Status: DISCONTINUED | OUTPATIENT
Start: 2020-11-06 | End: 2020-11-10

## 2020-11-05 RX ADMIN — MIRTAZAPINE 45 MILLIGRAM(S): 45 TABLET, ORALLY DISINTEGRATING ORAL at 20:59

## 2020-11-05 RX ADMIN — CLOZAPINE 350 MILLIGRAM(S): 150 TABLET, ORALLY DISINTEGRATING ORAL at 20:59

## 2020-11-05 RX ADMIN — Medication 1 MILLIGRAM(S): at 08:33

## 2020-11-05 RX ADMIN — PERPHENAZINE 4 MILLIGRAM(S): 8 TABLET, FILM COATED ORAL at 20:59

## 2020-11-05 RX ADMIN — PERPHENAZINE 4 MILLIGRAM(S): 8 TABLET, FILM COATED ORAL at 08:32

## 2020-11-05 RX ADMIN — Medication 1 MILLIGRAM(S): at 17:40

## 2020-11-05 RX ADMIN — RISPERIDONE 6 MILLIGRAM(S): 4 TABLET ORAL at 20:59

## 2020-11-06 PROCEDURE — 99232 SBSQ HOSP IP/OBS MODERATE 35: CPT

## 2020-11-06 RX ADMIN — CLOZAPINE 350 MILLIGRAM(S): 150 TABLET, ORALLY DISINTEGRATING ORAL at 20:34

## 2020-11-06 RX ADMIN — RISPERIDONE 6 MILLIGRAM(S): 4 TABLET ORAL at 20:34

## 2020-11-06 RX ADMIN — LAMOTRIGINE 25 MILLIGRAM(S): 25 TABLET, ORALLY DISINTEGRATING ORAL at 08:21

## 2020-11-06 RX ADMIN — Medication 1 MILLIGRAM(S): at 16:59

## 2020-11-06 RX ADMIN — PERPHENAZINE 4 MILLIGRAM(S): 8 TABLET, FILM COATED ORAL at 08:21

## 2020-11-06 RX ADMIN — Medication 50 MILLIGRAM(S): at 22:25

## 2020-11-06 RX ADMIN — MIRTAZAPINE 45 MILLIGRAM(S): 45 TABLET, ORALLY DISINTEGRATING ORAL at 20:34

## 2020-11-07 PROCEDURE — 99232 SBSQ HOSP IP/OBS MODERATE 35: CPT

## 2020-11-07 RX ORDER — POLYETHYLENE GLYCOL 3350 17 G/17G
17 POWDER, FOR SOLUTION ORAL DAILY
Refills: 0 | Status: DISCONTINUED | OUTPATIENT
Start: 2020-11-07 | End: 2020-11-10

## 2020-11-07 RX ORDER — SENNA PLUS 8.6 MG/1
2 TABLET ORAL AT BEDTIME
Refills: 0 | Status: DISCONTINUED | OUTPATIENT
Start: 2020-11-07 | End: 2020-12-04

## 2020-11-07 RX ADMIN — Medication 1 MILLIGRAM(S): at 13:00

## 2020-11-07 RX ADMIN — Medication 50 MILLIGRAM(S): at 13:00

## 2020-11-07 RX ADMIN — CLOZAPINE 350 MILLIGRAM(S): 150 TABLET, ORALLY DISINTEGRATING ORAL at 20:23

## 2020-11-07 RX ADMIN — RISPERIDONE 6 MILLIGRAM(S): 4 TABLET ORAL at 20:23

## 2020-11-07 RX ADMIN — Medication 50 MILLIGRAM(S): at 20:41

## 2020-11-07 RX ADMIN — SENNA PLUS 2 TABLET(S): 8.6 TABLET ORAL at 20:23

## 2020-11-07 RX ADMIN — MIRTAZAPINE 45 MILLIGRAM(S): 45 TABLET, ORALLY DISINTEGRATING ORAL at 20:23

## 2020-11-07 RX ADMIN — LAMOTRIGINE 25 MILLIGRAM(S): 25 TABLET, ORALLY DISINTEGRATING ORAL at 09:02

## 2020-11-08 PROCEDURE — 99232 SBSQ HOSP IP/OBS MODERATE 35: CPT

## 2020-11-08 RX ADMIN — POLYETHYLENE GLYCOL 3350 17 GRAM(S): 17 POWDER, FOR SOLUTION ORAL at 08:36

## 2020-11-08 RX ADMIN — Medication 1 MILLIGRAM(S): at 08:25

## 2020-11-08 RX ADMIN — Medication 1 MILLIGRAM(S): at 15:00

## 2020-11-08 RX ADMIN — LAMOTRIGINE 25 MILLIGRAM(S): 25 TABLET, ORALLY DISINTEGRATING ORAL at 08:36

## 2020-11-08 RX ADMIN — CLOZAPINE 350 MILLIGRAM(S): 150 TABLET, ORALLY DISINTEGRATING ORAL at 20:24

## 2020-11-08 RX ADMIN — Medication 1 MILLIGRAM(S): at 12:37

## 2020-11-08 RX ADMIN — MIRTAZAPINE 45 MILLIGRAM(S): 45 TABLET, ORALLY DISINTEGRATING ORAL at 20:24

## 2020-11-08 RX ADMIN — SENNA PLUS 2 TABLET(S): 8.6 TABLET ORAL at 20:24

## 2020-11-08 RX ADMIN — Medication 50 MILLIGRAM(S): at 15:00

## 2020-11-08 RX ADMIN — RISPERIDONE 6 MILLIGRAM(S): 4 TABLET ORAL at 20:24

## 2020-11-08 RX ADMIN — PERPHENAZINE 5 MILLIGRAM(S): 8 TABLET, FILM COATED ORAL at 12:38

## 2020-11-09 PROCEDURE — 99232 SBSQ HOSP IP/OBS MODERATE 35: CPT

## 2020-11-09 PROCEDURE — 90853 GROUP PSYCHOTHERAPY: CPT

## 2020-11-09 PROCEDURE — 90832 PSYTX W PT 30 MINUTES: CPT | Mod: 59

## 2020-11-09 RX ORDER — MIRTAZAPINE 45 MG/1
22.5 TABLET, ORALLY DISINTEGRATING ORAL AT BEDTIME
Refills: 0 | Status: DISCONTINUED | OUTPATIENT
Start: 2020-11-09 | End: 2020-11-10

## 2020-11-09 RX ORDER — PALIPERIDONE 1.5 MG/1
1 TABLET, EXTENDED RELEASE ORAL
Qty: 30 | Refills: 0
Start: 2020-11-09 | End: 2020-12-08

## 2020-11-09 RX ADMIN — PERPHENAZINE 5 MILLIGRAM(S): 8 TABLET, FILM COATED ORAL at 16:42

## 2020-11-09 RX ADMIN — RISPERIDONE 6 MILLIGRAM(S): 4 TABLET ORAL at 20:55

## 2020-11-09 RX ADMIN — CLOZAPINE 350 MILLIGRAM(S): 150 TABLET, ORALLY DISINTEGRATING ORAL at 20:55

## 2020-11-09 RX ADMIN — PERPHENAZINE 5 MILLIGRAM(S): 8 TABLET, FILM COATED ORAL at 08:45

## 2020-11-09 RX ADMIN — Medication 1 MILLIGRAM(S): at 08:45

## 2020-11-09 RX ADMIN — SENNA PLUS 2 TABLET(S): 8.6 TABLET ORAL at 20:55

## 2020-11-09 RX ADMIN — LAMOTRIGINE 25 MILLIGRAM(S): 25 TABLET, ORALLY DISINTEGRATING ORAL at 08:27

## 2020-11-09 RX ADMIN — MIRTAZAPINE 22.5 MILLIGRAM(S): 45 TABLET, ORALLY DISINTEGRATING ORAL at 20:55

## 2020-11-09 RX ADMIN — Medication 1 MILLIGRAM(S): at 16:25

## 2020-11-09 RX ADMIN — Medication 50 MILLIGRAM(S): at 16:42

## 2020-11-09 RX ADMIN — POLYETHYLENE GLYCOL 3350 17 GRAM(S): 17 POWDER, FOR SOLUTION ORAL at 08:27

## 2020-11-10 PROCEDURE — 99232 SBSQ HOSP IP/OBS MODERATE 35: CPT

## 2020-11-10 RX ORDER — DIVALPROEX SODIUM 500 MG/1
500 TABLET, DELAYED RELEASE ORAL AT BEDTIME
Refills: 0 | Status: DISCONTINUED | OUTPATIENT
Start: 2020-11-10 | End: 2020-11-11

## 2020-11-10 RX ORDER — POLYETHYLENE GLYCOL 3350 17 G/17G
17 POWDER, FOR SOLUTION ORAL
Refills: 0 | Status: DISCONTINUED | OUTPATIENT
Start: 2020-11-10 | End: 2020-12-04

## 2020-11-10 RX ADMIN — Medication 50 MILLIGRAM(S): at 16:45

## 2020-11-10 RX ADMIN — LAMOTRIGINE 25 MILLIGRAM(S): 25 TABLET, ORALLY DISINTEGRATING ORAL at 09:12

## 2020-11-10 RX ADMIN — POLYETHYLENE GLYCOL 3350 17 GRAM(S): 17 POWDER, FOR SOLUTION ORAL at 09:12

## 2020-11-10 RX ADMIN — Medication 1 MILLIGRAM(S): at 16:45

## 2020-11-10 RX ADMIN — DIVALPROEX SODIUM 500 MILLIGRAM(S): 500 TABLET, DELAYED RELEASE ORAL at 20:28

## 2020-11-10 RX ADMIN — RISPERIDONE 6 MILLIGRAM(S): 4 TABLET ORAL at 20:28

## 2020-11-10 RX ADMIN — CLOZAPINE 350 MILLIGRAM(S): 150 TABLET, ORALLY DISINTEGRATING ORAL at 20:28

## 2020-11-10 RX ADMIN — POLYETHYLENE GLYCOL 3350 17 GRAM(S): 17 POWDER, FOR SOLUTION ORAL at 20:28

## 2020-11-10 RX ADMIN — SENNA PLUS 2 TABLET(S): 8.6 TABLET ORAL at 20:28

## 2020-11-11 RX ORDER — DIVALPROEX SODIUM 500 MG/1
1000 TABLET, DELAYED RELEASE ORAL AT BEDTIME
Refills: 0 | Status: DISCONTINUED | OUTPATIENT
Start: 2020-11-11 | End: 2020-11-12

## 2020-11-11 RX ORDER — PALIPERIDONE 1.5 MG/1
9 TABLET, EXTENDED RELEASE ORAL AT BEDTIME
Refills: 0 | Status: DISCONTINUED | OUTPATIENT
Start: 2020-11-11 | End: 2020-11-24

## 2020-11-11 RX ORDER — DESVENLAFAXINE 50 MG/1
25 TABLET, EXTENDED RELEASE ORAL DAILY
Refills: 0 | Status: DISCONTINUED | OUTPATIENT
Start: 2020-11-12 | End: 2020-11-16

## 2020-11-11 RX ADMIN — Medication 1 MILLIGRAM(S): at 08:36

## 2020-11-11 RX ADMIN — CLOZAPINE 350 MILLIGRAM(S): 150 TABLET, ORALLY DISINTEGRATING ORAL at 21:03

## 2020-11-11 RX ADMIN — POLYETHYLENE GLYCOL 3350 17 GRAM(S): 17 POWDER, FOR SOLUTION ORAL at 08:31

## 2020-11-11 RX ADMIN — POLYETHYLENE GLYCOL 3350 17 GRAM(S): 17 POWDER, FOR SOLUTION ORAL at 20:23

## 2020-11-11 RX ADMIN — Medication 1 MILLIGRAM(S): at 21:03

## 2020-11-11 RX ADMIN — Medication 50 MILLIGRAM(S): at 08:36

## 2020-11-11 RX ADMIN — DIVALPROEX SODIUM 1000 MILLIGRAM(S): 500 TABLET, DELAYED RELEASE ORAL at 20:23

## 2020-11-11 RX ADMIN — Medication 50 MILLIGRAM(S): at 21:03

## 2020-11-11 RX ADMIN — SENNA PLUS 2 TABLET(S): 8.6 TABLET ORAL at 20:23

## 2020-11-11 RX ADMIN — PALIPERIDONE 9 MILLIGRAM(S): 1.5 TABLET, EXTENDED RELEASE ORAL at 20:23

## 2020-11-12 PROCEDURE — 90832 PSYTX W PT 30 MINUTES: CPT

## 2020-11-12 PROCEDURE — 99232 SBSQ HOSP IP/OBS MODERATE 35: CPT

## 2020-11-12 RX ORDER — METOPROLOL TARTRATE 50 MG
25 TABLET ORAL AT BEDTIME
Refills: 0 | Status: DISCONTINUED | OUTPATIENT
Start: 2020-11-12 | End: 2020-11-13

## 2020-11-12 RX ORDER — CLOZAPINE 150 MG/1
375 TABLET, ORALLY DISINTEGRATING ORAL AT BEDTIME
Refills: 0 | Status: DISCONTINUED | OUTPATIENT
Start: 2020-11-12 | End: 2020-11-13

## 2020-11-12 RX ORDER — DIVALPROEX SODIUM 500 MG/1
1500 TABLET, DELAYED RELEASE ORAL AT BEDTIME
Refills: 0 | Status: DISCONTINUED | OUTPATIENT
Start: 2020-11-12 | End: 2020-11-30

## 2020-11-12 RX ADMIN — PALIPERIDONE 9 MILLIGRAM(S): 1.5 TABLET, EXTENDED RELEASE ORAL at 21:07

## 2020-11-12 RX ADMIN — PERPHENAZINE 5 MILLIGRAM(S): 8 TABLET, FILM COATED ORAL at 18:46

## 2020-11-12 RX ADMIN — DIVALPROEX SODIUM 1500 MILLIGRAM(S): 500 TABLET, DELAYED RELEASE ORAL at 21:07

## 2020-11-12 RX ADMIN — CLOZAPINE 375 MILLIGRAM(S): 150 TABLET, ORALLY DISINTEGRATING ORAL at 21:07

## 2020-11-12 RX ADMIN — DESVENLAFAXINE 25 MILLIGRAM(S): 50 TABLET, EXTENDED RELEASE ORAL at 08:25

## 2020-11-12 RX ADMIN — Medication 25 MILLIGRAM(S): at 21:07

## 2020-11-12 RX ADMIN — POLYETHYLENE GLYCOL 3350 17 GRAM(S): 17 POWDER, FOR SOLUTION ORAL at 08:25

## 2020-11-12 RX ADMIN — POLYETHYLENE GLYCOL 3350 17 GRAM(S): 17 POWDER, FOR SOLUTION ORAL at 21:07

## 2020-11-12 RX ADMIN — SENNA PLUS 2 TABLET(S): 8.6 TABLET ORAL at 21:07

## 2020-11-13 PROCEDURE — 90832 PSYTX W PT 30 MINUTES: CPT

## 2020-11-13 RX ORDER — METOPROLOL TARTRATE 50 MG
50 TABLET ORAL AT BEDTIME
Refills: 0 | Status: DISCONTINUED | OUTPATIENT
Start: 2020-11-13 | End: 2020-11-16

## 2020-11-13 RX ORDER — CLOZAPINE 150 MG/1
400 TABLET, ORALLY DISINTEGRATING ORAL AT BEDTIME
Refills: 0 | Status: DISCONTINUED | OUTPATIENT
Start: 2020-11-13 | End: 2020-11-16

## 2020-11-13 RX ADMIN — PALIPERIDONE 9 MILLIGRAM(S): 1.5 TABLET, EXTENDED RELEASE ORAL at 21:02

## 2020-11-13 RX ADMIN — CLOZAPINE 400 MILLIGRAM(S): 150 TABLET, ORALLY DISINTEGRATING ORAL at 21:02

## 2020-11-13 RX ADMIN — Medication 50 MILLIGRAM(S): at 21:02

## 2020-11-13 RX ADMIN — DESVENLAFAXINE 25 MILLIGRAM(S): 50 TABLET, EXTENDED RELEASE ORAL at 08:29

## 2020-11-13 RX ADMIN — DIVALPROEX SODIUM 1500 MILLIGRAM(S): 500 TABLET, DELAYED RELEASE ORAL at 21:02

## 2020-11-13 RX ADMIN — SENNA PLUS 2 TABLET(S): 8.6 TABLET ORAL at 21:02

## 2020-11-13 RX ADMIN — POLYETHYLENE GLYCOL 3350 17 GRAM(S): 17 POWDER, FOR SOLUTION ORAL at 08:29

## 2020-11-14 RX ORDER — TRAZODONE HCL 50 MG
50 TABLET ORAL ONCE
Refills: 0 | Status: COMPLETED | OUTPATIENT
Start: 2020-11-14 | End: 2020-11-15

## 2020-11-14 RX ADMIN — Medication 50 MILLIGRAM(S): at 20:39

## 2020-11-14 RX ADMIN — DESVENLAFAXINE 25 MILLIGRAM(S): 50 TABLET, EXTENDED RELEASE ORAL at 09:19

## 2020-11-14 RX ADMIN — CLOZAPINE 400 MILLIGRAM(S): 150 TABLET, ORALLY DISINTEGRATING ORAL at 20:39

## 2020-11-14 RX ADMIN — SENNA PLUS 2 TABLET(S): 8.6 TABLET ORAL at 20:39

## 2020-11-14 RX ADMIN — PALIPERIDONE 9 MILLIGRAM(S): 1.5 TABLET, EXTENDED RELEASE ORAL at 20:39

## 2020-11-14 RX ADMIN — Medication 50 MILLIGRAM(S): at 17:36

## 2020-11-14 RX ADMIN — PERPHENAZINE 5 MILLIGRAM(S): 8 TABLET, FILM COATED ORAL at 17:37

## 2020-11-14 RX ADMIN — DIVALPROEX SODIUM 1500 MILLIGRAM(S): 500 TABLET, DELAYED RELEASE ORAL at 20:39

## 2020-11-15 RX ADMIN — PALIPERIDONE 9 MILLIGRAM(S): 1.5 TABLET, EXTENDED RELEASE ORAL at 20:29

## 2020-11-15 RX ADMIN — SENNA PLUS 2 TABLET(S): 8.6 TABLET ORAL at 20:30

## 2020-11-15 RX ADMIN — CLOZAPINE 400 MILLIGRAM(S): 150 TABLET, ORALLY DISINTEGRATING ORAL at 20:29

## 2020-11-15 RX ADMIN — POLYETHYLENE GLYCOL 3350 17 GRAM(S): 17 POWDER, FOR SOLUTION ORAL at 20:30

## 2020-11-15 RX ADMIN — Medication 50 MILLIGRAM(S): at 20:29

## 2020-11-15 RX ADMIN — PERPHENAZINE 5 MILLIGRAM(S): 8 TABLET, FILM COATED ORAL at 17:35

## 2020-11-15 RX ADMIN — Medication 50 MILLIGRAM(S): at 00:00

## 2020-11-15 RX ADMIN — DIVALPROEX SODIUM 1500 MILLIGRAM(S): 500 TABLET, DELAYED RELEASE ORAL at 20:29

## 2020-11-15 RX ADMIN — PERPHENAZINE 5 MILLIGRAM(S): 8 TABLET, FILM COATED ORAL at 09:33

## 2020-11-15 RX ADMIN — DESVENLAFAXINE 25 MILLIGRAM(S): 50 TABLET, EXTENDED RELEASE ORAL at 08:43

## 2020-11-15 RX ADMIN — Medication 50 MILLIGRAM(S): at 17:35

## 2020-11-15 RX ADMIN — Medication 50 MILLIGRAM(S): at 09:33

## 2020-11-16 ENCOUNTER — TRANSCRIPTION ENCOUNTER (OUTPATIENT)
Age: 30
End: 2020-11-16

## 2020-11-16 LAB — CLOZAPINE SERPL-MCNC: SIGNIFICANT CHANGE UP

## 2020-11-16 PROCEDURE — 90832 PSYTX W PT 30 MINUTES: CPT

## 2020-11-16 RX ORDER — METOPROLOL TARTRATE 50 MG
75 TABLET ORAL AT BEDTIME
Refills: 0 | Status: DISCONTINUED | OUTPATIENT
Start: 2020-11-16 | End: 2020-11-17

## 2020-11-16 RX ORDER — DESVENLAFAXINE 50 MG/1
50 TABLET, EXTENDED RELEASE ORAL DAILY
Refills: 0 | Status: DISCONTINUED | OUTPATIENT
Start: 2020-11-17 | End: 2020-11-24

## 2020-11-16 RX ORDER — CLOZAPINE 150 MG/1
425 TABLET, ORALLY DISINTEGRATING ORAL AT BEDTIME
Refills: 0 | Status: DISCONTINUED | OUTPATIENT
Start: 2020-11-16 | End: 2020-11-17

## 2020-11-16 RX ADMIN — PERPHENAZINE 5 MILLIGRAM(S): 8 TABLET, FILM COATED ORAL at 15:50

## 2020-11-16 RX ADMIN — POLYETHYLENE GLYCOL 3350 17 GRAM(S): 17 POWDER, FOR SOLUTION ORAL at 21:23

## 2020-11-16 RX ADMIN — PALIPERIDONE 9 MILLIGRAM(S): 1.5 TABLET, EXTENDED RELEASE ORAL at 21:23

## 2020-11-16 RX ADMIN — Medication 75 MILLIGRAM(S): at 21:23

## 2020-11-16 RX ADMIN — DIVALPROEX SODIUM 1500 MILLIGRAM(S): 500 TABLET, DELAYED RELEASE ORAL at 21:23

## 2020-11-16 RX ADMIN — CLOZAPINE 425 MILLIGRAM(S): 150 TABLET, ORALLY DISINTEGRATING ORAL at 21:23

## 2020-11-16 RX ADMIN — POLYETHYLENE GLYCOL 3350 17 GRAM(S): 17 POWDER, FOR SOLUTION ORAL at 08:43

## 2020-11-16 RX ADMIN — DESVENLAFAXINE 25 MILLIGRAM(S): 50 TABLET, EXTENDED RELEASE ORAL at 08:43

## 2020-11-16 RX ADMIN — SENNA PLUS 2 TABLET(S): 8.6 TABLET ORAL at 21:23

## 2020-11-16 RX ADMIN — Medication 50 MILLIGRAM(S): at 15:50

## 2020-11-17 PROCEDURE — 90832 PSYTX W PT 30 MINUTES: CPT

## 2020-11-17 RX ORDER — METOPROLOL TARTRATE 50 MG
100 TABLET ORAL AT BEDTIME
Refills: 0 | Status: DISCONTINUED | OUTPATIENT
Start: 2020-11-17 | End: 2020-11-23

## 2020-11-17 RX ORDER — CLOZAPINE 150 MG/1
450 TABLET, ORALLY DISINTEGRATING ORAL AT BEDTIME
Refills: 0 | Status: DISCONTINUED | OUTPATIENT
Start: 2020-11-17 | End: 2020-11-18

## 2020-11-17 RX ADMIN — Medication 2 MILLIGRAM(S): at 23:30

## 2020-11-17 RX ADMIN — DESVENLAFAXINE 50 MILLIGRAM(S): 50 TABLET, EXTENDED RELEASE ORAL at 08:22

## 2020-11-17 RX ADMIN — SENNA PLUS 2 TABLET(S): 8.6 TABLET ORAL at 21:13

## 2020-11-17 RX ADMIN — CLOZAPINE 450 MILLIGRAM(S): 150 TABLET, ORALLY DISINTEGRATING ORAL at 21:13

## 2020-11-17 RX ADMIN — POLYETHYLENE GLYCOL 3350 17 GRAM(S): 17 POWDER, FOR SOLUTION ORAL at 08:23

## 2020-11-17 RX ADMIN — Medication 50 MILLIGRAM(S): at 21:19

## 2020-11-17 RX ADMIN — PERPHENAZINE 5 MILLIGRAM(S): 8 TABLET, FILM COATED ORAL at 13:40

## 2020-11-17 RX ADMIN — Medication 50 MILLIGRAM(S): at 13:40

## 2020-11-17 RX ADMIN — POLYETHYLENE GLYCOL 3350 17 GRAM(S): 17 POWDER, FOR SOLUTION ORAL at 21:36

## 2020-11-17 RX ADMIN — PERPHENAZINE 5 MILLIGRAM(S): 8 TABLET, FILM COATED ORAL at 21:40

## 2020-11-17 RX ADMIN — PALIPERIDONE 9 MILLIGRAM(S): 1.5 TABLET, EXTENDED RELEASE ORAL at 21:13

## 2020-11-17 RX ADMIN — Medication 100 MILLIGRAM(S): at 21:13

## 2020-11-17 RX ADMIN — DIVALPROEX SODIUM 1500 MILLIGRAM(S): 500 TABLET, DELAYED RELEASE ORAL at 21:13

## 2020-11-18 RX ORDER — CLOZAPINE 150 MG/1
475 TABLET, ORALLY DISINTEGRATING ORAL AT BEDTIME
Refills: 0 | Status: DISCONTINUED | OUTPATIENT
Start: 2020-11-18 | End: 2020-11-19

## 2020-11-18 RX ADMIN — SENNA PLUS 2 TABLET(S): 8.6 TABLET ORAL at 20:56

## 2020-11-18 RX ADMIN — PALIPERIDONE 9 MILLIGRAM(S): 1.5 TABLET, EXTENDED RELEASE ORAL at 20:55

## 2020-11-18 RX ADMIN — DIVALPROEX SODIUM 1500 MILLIGRAM(S): 500 TABLET, DELAYED RELEASE ORAL at 20:55

## 2020-11-18 RX ADMIN — POLYETHYLENE GLYCOL 3350 17 GRAM(S): 17 POWDER, FOR SOLUTION ORAL at 20:55

## 2020-11-18 RX ADMIN — DESVENLAFAXINE 50 MILLIGRAM(S): 50 TABLET, EXTENDED RELEASE ORAL at 08:24

## 2020-11-18 RX ADMIN — Medication 100 MILLIGRAM(S): at 20:55

## 2020-11-18 RX ADMIN — CLOZAPINE 475 MILLIGRAM(S): 150 TABLET, ORALLY DISINTEGRATING ORAL at 20:55

## 2020-11-19 RX ORDER — CLOZAPINE 150 MG/1
500 TABLET, ORALLY DISINTEGRATING ORAL AT BEDTIME
Refills: 0 | Status: DISCONTINUED | OUTPATIENT
Start: 2020-11-19 | End: 2020-12-04

## 2020-11-19 RX ADMIN — PALIPERIDONE 9 MILLIGRAM(S): 1.5 TABLET, EXTENDED RELEASE ORAL at 20:45

## 2020-11-19 RX ADMIN — PERPHENAZINE 5 MILLIGRAM(S): 8 TABLET, FILM COATED ORAL at 16:17

## 2020-11-19 RX ADMIN — Medication 100 MILLIGRAM(S): at 20:45

## 2020-11-19 RX ADMIN — DESVENLAFAXINE 50 MILLIGRAM(S): 50 TABLET, EXTENDED RELEASE ORAL at 08:59

## 2020-11-19 RX ADMIN — CLOZAPINE 500 MILLIGRAM(S): 150 TABLET, ORALLY DISINTEGRATING ORAL at 20:45

## 2020-11-19 RX ADMIN — DIVALPROEX SODIUM 1500 MILLIGRAM(S): 500 TABLET, DELAYED RELEASE ORAL at 20:45

## 2020-11-19 RX ADMIN — Medication 50 MILLIGRAM(S): at 16:17

## 2020-11-19 RX ADMIN — SENNA PLUS 2 TABLET(S): 8.6 TABLET ORAL at 20:45

## 2020-11-19 RX ADMIN — POLYETHYLENE GLYCOL 3350 17 GRAM(S): 17 POWDER, FOR SOLUTION ORAL at 20:45

## 2020-11-20 LAB
BASOPHILS # BLD AUTO: 0.02 K/UL — SIGNIFICANT CHANGE UP (ref 0–0.2)
BASOPHILS NFR BLD AUTO: 0.2 % — SIGNIFICANT CHANGE UP (ref 0–2)
EOSINOPHIL # BLD AUTO: 0.02 K/UL — SIGNIFICANT CHANGE UP (ref 0–0.5)
EOSINOPHIL NFR BLD AUTO: 0.2 % — SIGNIFICANT CHANGE UP (ref 0–6)
HCT VFR BLD CALC: 42.8 % — SIGNIFICANT CHANGE UP (ref 39–50)
HGB BLD-MCNC: 13.7 G/DL — SIGNIFICANT CHANGE UP (ref 13–17)
IMM GRANULOCYTES NFR BLD AUTO: 0.5 % — SIGNIFICANT CHANGE UP (ref 0–1.5)
LYMPHOCYTES # BLD AUTO: 2.85 K/UL — SIGNIFICANT CHANGE UP (ref 1–3.3)
LYMPHOCYTES # BLD AUTO: 33.8 % — SIGNIFICANT CHANGE UP (ref 13–44)
MCHC RBC-ENTMCNC: 26.5 PG — LOW (ref 27–34)
MCHC RBC-ENTMCNC: 32 % — SIGNIFICANT CHANGE UP (ref 32–36)
MCV RBC AUTO: 82.8 FL — SIGNIFICANT CHANGE UP (ref 80–100)
MONOCYTES # BLD AUTO: 0.49 K/UL — SIGNIFICANT CHANGE UP (ref 0–0.9)
MONOCYTES NFR BLD AUTO: 5.8 % — SIGNIFICANT CHANGE UP (ref 2–14)
NEUTROPHILS # BLD AUTO: 5 K/UL — SIGNIFICANT CHANGE UP (ref 1.8–7.4)
NEUTROPHILS NFR BLD AUTO: 59.5 % — SIGNIFICANT CHANGE UP (ref 43–77)
NRBC # FLD: 0 K/UL — SIGNIFICANT CHANGE UP (ref 0–0)
PLATELET # BLD AUTO: 216 K/UL — SIGNIFICANT CHANGE UP (ref 150–400)
PMV BLD: 11.4 FL — SIGNIFICANT CHANGE UP (ref 7–13)
RBC # BLD: 5.17 M/UL — SIGNIFICANT CHANGE UP (ref 4.2–5.8)
RBC # FLD: 14.6 % — HIGH (ref 10.3–14.5)
VALPROATE SERPL-MCNC: 65.6 UG/ML — SIGNIFICANT CHANGE UP (ref 50–100)
WBC # BLD: 8.42 K/UL — SIGNIFICANT CHANGE UP (ref 3.8–10.5)
WBC # FLD AUTO: 8.42 K/UL — SIGNIFICANT CHANGE UP (ref 3.8–10.5)

## 2020-11-20 PROCEDURE — 99232 SBSQ HOSP IP/OBS MODERATE 35: CPT

## 2020-11-20 PROCEDURE — 90832 PSYTX W PT 30 MINUTES: CPT

## 2020-11-20 RX ORDER — CLONAZEPAM 1 MG
0.5 TABLET ORAL
Refills: 0 | Status: DISCONTINUED | OUTPATIENT
Start: 2020-11-20 | End: 2020-11-24

## 2020-11-20 RX ADMIN — PALIPERIDONE 9 MILLIGRAM(S): 1.5 TABLET, EXTENDED RELEASE ORAL at 21:06

## 2020-11-20 RX ADMIN — PERPHENAZINE 5 MILLIGRAM(S): 8 TABLET, FILM COATED ORAL at 15:05

## 2020-11-20 RX ADMIN — Medication 50 MILLIGRAM(S): at 16:47

## 2020-11-20 RX ADMIN — Medication 100 MILLIGRAM(S): at 21:06

## 2020-11-20 RX ADMIN — CLOZAPINE 500 MILLIGRAM(S): 150 TABLET, ORALLY DISINTEGRATING ORAL at 21:06

## 2020-11-20 RX ADMIN — Medication 0.5 MILLIGRAM(S): at 18:04

## 2020-11-20 RX ADMIN — DIVALPROEX SODIUM 1500 MILLIGRAM(S): 500 TABLET, DELAYED RELEASE ORAL at 21:06

## 2020-11-20 RX ADMIN — SENNA PLUS 2 TABLET(S): 8.6 TABLET ORAL at 21:06

## 2020-11-20 RX ADMIN — DESVENLAFAXINE 50 MILLIGRAM(S): 50 TABLET, EXTENDED RELEASE ORAL at 09:13

## 2020-11-20 RX ADMIN — POLYETHYLENE GLYCOL 3350 17 GRAM(S): 17 POWDER, FOR SOLUTION ORAL at 09:13

## 2020-11-21 RX ADMIN — PERPHENAZINE 5 MILLIGRAM(S): 8 TABLET, FILM COATED ORAL at 13:15

## 2020-11-21 RX ADMIN — Medication 100 MILLIGRAM(S): at 20:37

## 2020-11-21 RX ADMIN — Medication 0.5 MILLIGRAM(S): at 12:45

## 2020-11-21 RX ADMIN — SENNA PLUS 2 TABLET(S): 8.6 TABLET ORAL at 20:37

## 2020-11-21 RX ADMIN — DIVALPROEX SODIUM 1500 MILLIGRAM(S): 500 TABLET, DELAYED RELEASE ORAL at 20:37

## 2020-11-21 RX ADMIN — PALIPERIDONE 9 MILLIGRAM(S): 1.5 TABLET, EXTENDED RELEASE ORAL at 20:37

## 2020-11-21 RX ADMIN — CLOZAPINE 500 MILLIGRAM(S): 150 TABLET, ORALLY DISINTEGRATING ORAL at 20:37

## 2020-11-21 RX ADMIN — DESVENLAFAXINE 50 MILLIGRAM(S): 50 TABLET, EXTENDED RELEASE ORAL at 09:23

## 2020-11-22 RX ADMIN — PALIPERIDONE 9 MILLIGRAM(S): 1.5 TABLET, EXTENDED RELEASE ORAL at 20:31

## 2020-11-22 RX ADMIN — DIVALPROEX SODIUM 1500 MILLIGRAM(S): 500 TABLET, DELAYED RELEASE ORAL at 20:31

## 2020-11-22 RX ADMIN — DESVENLAFAXINE 50 MILLIGRAM(S): 50 TABLET, EXTENDED RELEASE ORAL at 08:12

## 2020-11-22 RX ADMIN — Medication 100 MILLIGRAM(S): at 20:31

## 2020-11-22 RX ADMIN — PERPHENAZINE 5 MILLIGRAM(S): 8 TABLET, FILM COATED ORAL at 15:41

## 2020-11-22 RX ADMIN — POLYETHYLENE GLYCOL 3350 17 GRAM(S): 17 POWDER, FOR SOLUTION ORAL at 20:31

## 2020-11-22 RX ADMIN — POLYETHYLENE GLYCOL 3350 17 GRAM(S): 17 POWDER, FOR SOLUTION ORAL at 08:12

## 2020-11-22 RX ADMIN — Medication 0.5 MILLIGRAM(S): at 12:42

## 2020-11-22 RX ADMIN — CLOZAPINE 500 MILLIGRAM(S): 150 TABLET, ORALLY DISINTEGRATING ORAL at 20:31

## 2020-11-22 RX ADMIN — Medication 50 MILLIGRAM(S): at 15:41

## 2020-11-22 RX ADMIN — SENNA PLUS 2 TABLET(S): 8.6 TABLET ORAL at 20:31

## 2020-11-22 RX ADMIN — Medication 0.5 MILLIGRAM(S): at 17:23

## 2020-11-23 PROCEDURE — 99232 SBSQ HOSP IP/OBS MODERATE 35: CPT

## 2020-11-23 RX ORDER — ARIPIPRAZOLE 15 MG/1
5 TABLET ORAL AT BEDTIME
Refills: 0 | Status: DISCONTINUED | OUTPATIENT
Start: 2020-11-23 | End: 2020-11-24

## 2020-11-23 RX ORDER — METOPROLOL TARTRATE 50 MG
150 TABLET ORAL AT BEDTIME
Refills: 0 | Status: DISCONTINUED | OUTPATIENT
Start: 2020-11-23 | End: 2020-12-04

## 2020-11-23 RX ADMIN — SENNA PLUS 2 TABLET(S): 8.6 TABLET ORAL at 20:36

## 2020-11-23 RX ADMIN — DESVENLAFAXINE 50 MILLIGRAM(S): 50 TABLET, EXTENDED RELEASE ORAL at 08:15

## 2020-11-23 RX ADMIN — ARIPIPRAZOLE 5 MILLIGRAM(S): 15 TABLET ORAL at 20:36

## 2020-11-23 RX ADMIN — DIVALPROEX SODIUM 1500 MILLIGRAM(S): 500 TABLET, DELAYED RELEASE ORAL at 20:36

## 2020-11-23 RX ADMIN — CLOZAPINE 500 MILLIGRAM(S): 150 TABLET, ORALLY DISINTEGRATING ORAL at 20:36

## 2020-11-23 RX ADMIN — POLYETHYLENE GLYCOL 3350 17 GRAM(S): 17 POWDER, FOR SOLUTION ORAL at 08:15

## 2020-11-23 RX ADMIN — Medication 0.5 MILLIGRAM(S): at 18:07

## 2020-11-23 RX ADMIN — Medication 0.5 MILLIGRAM(S): at 12:24

## 2020-11-23 RX ADMIN — Medication 50 MILLIGRAM(S): at 08:15

## 2020-11-23 RX ADMIN — Medication 150 MILLIGRAM(S): at 20:36

## 2020-11-23 RX ADMIN — PALIPERIDONE 9 MILLIGRAM(S): 1.5 TABLET, EXTENDED RELEASE ORAL at 20:36

## 2020-11-24 PROCEDURE — 90832 PSYTX W PT 30 MINUTES: CPT

## 2020-11-24 RX ORDER — ARIPIPRAZOLE 15 MG/1
10 TABLET ORAL AT BEDTIME
Refills: 0 | Status: DISCONTINUED | OUTPATIENT
Start: 2020-11-24 | End: 2020-11-30

## 2020-11-24 RX ORDER — CLONAZEPAM 1 MG
0.5 TABLET ORAL
Refills: 0 | Status: DISCONTINUED | OUTPATIENT
Start: 2020-11-24 | End: 2020-12-01

## 2020-11-24 RX ORDER — DESVENLAFAXINE 50 MG/1
75 TABLET, EXTENDED RELEASE ORAL DAILY
Refills: 0 | Status: DISCONTINUED | OUTPATIENT
Start: 2020-11-25 | End: 2020-12-01

## 2020-11-24 RX ORDER — PALIPERIDONE 1.5 MG/1
6 TABLET, EXTENDED RELEASE ORAL AT BEDTIME
Refills: 0 | Status: DISCONTINUED | OUTPATIENT
Start: 2020-11-24 | End: 2020-11-30

## 2020-11-24 RX ADMIN — PALIPERIDONE 6 MILLIGRAM(S): 1.5 TABLET, EXTENDED RELEASE ORAL at 20:45

## 2020-11-24 RX ADMIN — CLOZAPINE 500 MILLIGRAM(S): 150 TABLET, ORALLY DISINTEGRATING ORAL at 20:44

## 2020-11-24 RX ADMIN — DIVALPROEX SODIUM 1500 MILLIGRAM(S): 500 TABLET, DELAYED RELEASE ORAL at 20:45

## 2020-11-24 RX ADMIN — ARIPIPRAZOLE 10 MILLIGRAM(S): 15 TABLET ORAL at 20:44

## 2020-11-24 RX ADMIN — Medication 150 MILLIGRAM(S): at 20:45

## 2020-11-24 RX ADMIN — POLYETHYLENE GLYCOL 3350 17 GRAM(S): 17 POWDER, FOR SOLUTION ORAL at 20:45

## 2020-11-24 RX ADMIN — Medication 0.5 MILLIGRAM(S): at 17:16

## 2020-11-24 RX ADMIN — Medication 0.5 MILLIGRAM(S): at 13:01

## 2020-11-24 RX ADMIN — SENNA PLUS 2 TABLET(S): 8.6 TABLET ORAL at 20:45

## 2020-11-24 RX ADMIN — Medication 50 MILLIGRAM(S): at 14:39

## 2020-11-24 RX ADMIN — POLYETHYLENE GLYCOL 3350 17 GRAM(S): 17 POWDER, FOR SOLUTION ORAL at 08:16

## 2020-11-24 RX ADMIN — PERPHENAZINE 5 MILLIGRAM(S): 8 TABLET, FILM COATED ORAL at 14:39

## 2020-11-24 RX ADMIN — DESVENLAFAXINE 50 MILLIGRAM(S): 50 TABLET, EXTENDED RELEASE ORAL at 08:16

## 2020-11-25 PROCEDURE — 99232 SBSQ HOSP IP/OBS MODERATE 35: CPT

## 2020-11-25 RX ORDER — LANOLIN ALCOHOL/MO/W.PET/CERES
5 CREAM (GRAM) TOPICAL AT BEDTIME
Refills: 0 | Status: DISCONTINUED | OUTPATIENT
Start: 2020-11-25 | End: 2020-12-04

## 2020-11-25 RX ADMIN — Medication 150 MILLIGRAM(S): at 21:08

## 2020-11-25 RX ADMIN — PERPHENAZINE 5 MILLIGRAM(S): 8 TABLET, FILM COATED ORAL at 18:07

## 2020-11-25 RX ADMIN — PALIPERIDONE 6 MILLIGRAM(S): 1.5 TABLET, EXTENDED RELEASE ORAL at 21:08

## 2020-11-25 RX ADMIN — DESVENLAFAXINE 75 MILLIGRAM(S): 50 TABLET, EXTENDED RELEASE ORAL at 08:20

## 2020-11-25 RX ADMIN — SENNA PLUS 2 TABLET(S): 8.6 TABLET ORAL at 21:08

## 2020-11-25 RX ADMIN — Medication 50 MILLIGRAM(S): at 18:07

## 2020-11-25 RX ADMIN — POLYETHYLENE GLYCOL 3350 17 GRAM(S): 17 POWDER, FOR SOLUTION ORAL at 21:08

## 2020-11-25 RX ADMIN — ARIPIPRAZOLE 10 MILLIGRAM(S): 15 TABLET ORAL at 21:08

## 2020-11-25 RX ADMIN — Medication 0.5 MILLIGRAM(S): at 18:07

## 2020-11-25 RX ADMIN — CLOZAPINE 500 MILLIGRAM(S): 150 TABLET, ORALLY DISINTEGRATING ORAL at 21:08

## 2020-11-25 RX ADMIN — Medication 0.5 MILLIGRAM(S): at 12:29

## 2020-11-25 RX ADMIN — DIVALPROEX SODIUM 1500 MILLIGRAM(S): 500 TABLET, DELAYED RELEASE ORAL at 21:08

## 2020-11-26 RX ADMIN — Medication 0.5 MILLIGRAM(S): at 11:35

## 2020-11-26 RX ADMIN — PALIPERIDONE 6 MILLIGRAM(S): 1.5 TABLET, EXTENDED RELEASE ORAL at 20:38

## 2020-11-26 RX ADMIN — CLOZAPINE 500 MILLIGRAM(S): 150 TABLET, ORALLY DISINTEGRATING ORAL at 20:37

## 2020-11-26 RX ADMIN — DIVALPROEX SODIUM 1500 MILLIGRAM(S): 500 TABLET, DELAYED RELEASE ORAL at 20:38

## 2020-11-26 RX ADMIN — Medication 50 MILLIGRAM(S): at 11:36

## 2020-11-26 RX ADMIN — DESVENLAFAXINE 75 MILLIGRAM(S): 50 TABLET, EXTENDED RELEASE ORAL at 08:29

## 2020-11-26 RX ADMIN — POLYETHYLENE GLYCOL 3350 17 GRAM(S): 17 POWDER, FOR SOLUTION ORAL at 20:38

## 2020-11-26 RX ADMIN — ARIPIPRAZOLE 10 MILLIGRAM(S): 15 TABLET ORAL at 20:37

## 2020-11-26 RX ADMIN — Medication 5 MILLIGRAM(S): at 20:38

## 2020-11-26 RX ADMIN — Medication 150 MILLIGRAM(S): at 20:38

## 2020-11-26 RX ADMIN — SENNA PLUS 2 TABLET(S): 8.6 TABLET ORAL at 20:38

## 2020-11-26 RX ADMIN — PERPHENAZINE 5 MILLIGRAM(S): 8 TABLET, FILM COATED ORAL at 11:36

## 2020-11-26 RX ADMIN — Medication 0.5 MILLIGRAM(S): at 18:10

## 2020-11-26 RX ADMIN — Medication 50 MILLIGRAM(S): at 18:45

## 2020-11-27 PROCEDURE — 99232 SBSQ HOSP IP/OBS MODERATE 35: CPT

## 2020-11-27 RX ADMIN — DESVENLAFAXINE 75 MILLIGRAM(S): 50 TABLET, EXTENDED RELEASE ORAL at 08:28

## 2020-11-27 RX ADMIN — DIVALPROEX SODIUM 1500 MILLIGRAM(S): 500 TABLET, DELAYED RELEASE ORAL at 20:42

## 2020-11-27 RX ADMIN — PALIPERIDONE 6 MILLIGRAM(S): 1.5 TABLET, EXTENDED RELEASE ORAL at 20:42

## 2020-11-27 RX ADMIN — SENNA PLUS 2 TABLET(S): 8.6 TABLET ORAL at 20:42

## 2020-11-27 RX ADMIN — Medication 5 MILLIGRAM(S): at 21:24

## 2020-11-27 RX ADMIN — Medication 0.5 MILLIGRAM(S): at 11:54

## 2020-11-27 RX ADMIN — PERPHENAZINE 5 MILLIGRAM(S): 8 TABLET, FILM COATED ORAL at 15:33

## 2020-11-27 RX ADMIN — ARIPIPRAZOLE 10 MILLIGRAM(S): 15 TABLET ORAL at 20:42

## 2020-11-27 RX ADMIN — Medication 50 MILLIGRAM(S): at 15:33

## 2020-11-27 RX ADMIN — POLYETHYLENE GLYCOL 3350 17 GRAM(S): 17 POWDER, FOR SOLUTION ORAL at 08:28

## 2020-11-27 RX ADMIN — POLYETHYLENE GLYCOL 3350 17 GRAM(S): 17 POWDER, FOR SOLUTION ORAL at 20:42

## 2020-11-27 RX ADMIN — Medication 150 MILLIGRAM(S): at 20:42

## 2020-11-27 RX ADMIN — CLOZAPINE 500 MILLIGRAM(S): 150 TABLET, ORALLY DISINTEGRATING ORAL at 20:42

## 2020-11-27 RX ADMIN — Medication 0.5 MILLIGRAM(S): at 18:46

## 2020-11-28 PROCEDURE — 99232 SBSQ HOSP IP/OBS MODERATE 35: CPT

## 2020-11-28 RX ORDER — HYDROXYZINE HCL 10 MG
50 TABLET ORAL ONCE
Refills: 0 | Status: COMPLETED | OUTPATIENT
Start: 2020-11-28 | End: 2020-11-28

## 2020-11-28 RX ADMIN — Medication 0.5 MILLIGRAM(S): at 18:14

## 2020-11-28 RX ADMIN — SENNA PLUS 2 TABLET(S): 8.6 TABLET ORAL at 20:21

## 2020-11-28 RX ADMIN — POLYETHYLENE GLYCOL 3350 17 GRAM(S): 17 POWDER, FOR SOLUTION ORAL at 20:21

## 2020-11-28 RX ADMIN — CLOZAPINE 500 MILLIGRAM(S): 150 TABLET, ORALLY DISINTEGRATING ORAL at 20:21

## 2020-11-28 RX ADMIN — DESVENLAFAXINE 75 MILLIGRAM(S): 50 TABLET, EXTENDED RELEASE ORAL at 08:38

## 2020-11-28 RX ADMIN — PALIPERIDONE 6 MILLIGRAM(S): 1.5 TABLET, EXTENDED RELEASE ORAL at 20:21

## 2020-11-28 RX ADMIN — PERPHENAZINE 5 MILLIGRAM(S): 8 TABLET, FILM COATED ORAL at 12:24

## 2020-11-28 RX ADMIN — DIVALPROEX SODIUM 1500 MILLIGRAM(S): 500 TABLET, DELAYED RELEASE ORAL at 20:21

## 2020-11-28 RX ADMIN — ARIPIPRAZOLE 10 MILLIGRAM(S): 15 TABLET ORAL at 20:21

## 2020-11-28 RX ADMIN — Medication 50 MILLIGRAM(S): at 15:58

## 2020-11-28 RX ADMIN — Medication 150 MILLIGRAM(S): at 20:21

## 2020-11-28 RX ADMIN — Medication 50 MILLIGRAM(S): at 12:24

## 2020-11-28 RX ADMIN — Medication 0.5 MILLIGRAM(S): at 12:24

## 2020-11-29 RX ADMIN — PERPHENAZINE 5 MILLIGRAM(S): 8 TABLET, FILM COATED ORAL at 13:42

## 2020-11-29 RX ADMIN — CLOZAPINE 500 MILLIGRAM(S): 150 TABLET, ORALLY DISINTEGRATING ORAL at 20:24

## 2020-11-29 RX ADMIN — Medication 5 MILLIGRAM(S): at 20:36

## 2020-11-29 RX ADMIN — Medication 0.5 MILLIGRAM(S): at 08:10

## 2020-11-29 RX ADMIN — SENNA PLUS 2 TABLET(S): 8.6 TABLET ORAL at 20:24

## 2020-11-29 RX ADMIN — POLYETHYLENE GLYCOL 3350 17 GRAM(S): 17 POWDER, FOR SOLUTION ORAL at 20:24

## 2020-11-29 RX ADMIN — DESVENLAFAXINE 75 MILLIGRAM(S): 50 TABLET, EXTENDED RELEASE ORAL at 08:31

## 2020-11-29 RX ADMIN — Medication 0.5 MILLIGRAM(S): at 17:41

## 2020-11-29 RX ADMIN — ARIPIPRAZOLE 10 MILLIGRAM(S): 15 TABLET ORAL at 20:24

## 2020-11-29 RX ADMIN — DIVALPROEX SODIUM 1500 MILLIGRAM(S): 500 TABLET, DELAYED RELEASE ORAL at 20:24

## 2020-11-29 RX ADMIN — Medication 150 MILLIGRAM(S): at 20:24

## 2020-11-29 RX ADMIN — PALIPERIDONE 6 MILLIGRAM(S): 1.5 TABLET, EXTENDED RELEASE ORAL at 20:24

## 2020-11-30 PROCEDURE — 99232 SBSQ HOSP IP/OBS MODERATE 35: CPT

## 2020-11-30 RX ORDER — ARIPIPRAZOLE 15 MG/1
15 TABLET ORAL AT BEDTIME
Refills: 0 | Status: DISCONTINUED | OUTPATIENT
Start: 2020-11-30 | End: 2020-12-04

## 2020-11-30 RX ORDER — CHLORPROMAZINE HCL 10 MG
25 TABLET ORAL EVERY 4 HOURS
Refills: 0 | Status: DISCONTINUED | OUTPATIENT
Start: 2020-11-30 | End: 2020-12-04

## 2020-11-30 RX ORDER — DIVALPROEX SODIUM 500 MG/1
2000 TABLET, DELAYED RELEASE ORAL AT BEDTIME
Refills: 0 | Status: DISCONTINUED | OUTPATIENT
Start: 2020-11-30 | End: 2020-12-04

## 2020-11-30 RX ORDER — PALIPERIDONE 1.5 MG/1
3 TABLET, EXTENDED RELEASE ORAL AT BEDTIME
Refills: 0 | Status: DISCONTINUED | OUTPATIENT
Start: 2020-11-30 | End: 2020-12-04

## 2020-11-30 RX ADMIN — Medication 0.5 MILLIGRAM(S): at 18:27

## 2020-11-30 RX ADMIN — Medication 5 MILLIGRAM(S): at 20:49

## 2020-11-30 RX ADMIN — POLYETHYLENE GLYCOL 3350 17 GRAM(S): 17 POWDER, FOR SOLUTION ORAL at 08:21

## 2020-11-30 RX ADMIN — POLYETHYLENE GLYCOL 3350 17 GRAM(S): 17 POWDER, FOR SOLUTION ORAL at 20:49

## 2020-11-30 RX ADMIN — PALIPERIDONE 3 MILLIGRAM(S): 1.5 TABLET, EXTENDED RELEASE ORAL at 20:49

## 2020-11-30 RX ADMIN — DIVALPROEX SODIUM 2000 MILLIGRAM(S): 500 TABLET, DELAYED RELEASE ORAL at 20:49

## 2020-11-30 RX ADMIN — ARIPIPRAZOLE 15 MILLIGRAM(S): 15 TABLET ORAL at 20:49

## 2020-11-30 RX ADMIN — Medication 150 MILLIGRAM(S): at 20:49

## 2020-11-30 RX ADMIN — DESVENLAFAXINE 75 MILLIGRAM(S): 50 TABLET, EXTENDED RELEASE ORAL at 08:21

## 2020-11-30 RX ADMIN — SENNA PLUS 2 TABLET(S): 8.6 TABLET ORAL at 20:49

## 2020-11-30 RX ADMIN — Medication 25 MILLIGRAM(S): at 18:27

## 2020-11-30 RX ADMIN — CLOZAPINE 500 MILLIGRAM(S): 150 TABLET, ORALLY DISINTEGRATING ORAL at 20:49

## 2020-12-01 PROCEDURE — 99232 SBSQ HOSP IP/OBS MODERATE 35: CPT

## 2020-12-01 RX ORDER — CLONAZEPAM 1 MG
0.5 TABLET ORAL
Refills: 0 | Status: DISCONTINUED | OUTPATIENT
Start: 2020-12-01 | End: 2020-12-04

## 2020-12-01 RX ADMIN — DESVENLAFAXINE 75 MILLIGRAM(S): 50 TABLET, EXTENDED RELEASE ORAL at 10:12

## 2020-12-01 RX ADMIN — Medication 150 MILLIGRAM(S): at 20:59

## 2020-12-01 RX ADMIN — PALIPERIDONE 3 MILLIGRAM(S): 1.5 TABLET, EXTENDED RELEASE ORAL at 20:59

## 2020-12-01 RX ADMIN — Medication 0.5 MILLIGRAM(S): at 17:37

## 2020-12-01 RX ADMIN — ARIPIPRAZOLE 15 MILLIGRAM(S): 15 TABLET ORAL at 20:59

## 2020-12-01 RX ADMIN — DIVALPROEX SODIUM 2000 MILLIGRAM(S): 500 TABLET, DELAYED RELEASE ORAL at 20:59

## 2020-12-01 RX ADMIN — CLOZAPINE 500 MILLIGRAM(S): 150 TABLET, ORALLY DISINTEGRATING ORAL at 20:59

## 2020-12-01 RX ADMIN — Medication 25 MILLIGRAM(S): at 16:35

## 2020-12-01 RX ADMIN — Medication 0.5 MILLIGRAM(S): at 13:05

## 2020-12-01 RX ADMIN — SENNA PLUS 2 TABLET(S): 8.6 TABLET ORAL at 20:59

## 2020-12-02 PROCEDURE — 99232 SBSQ HOSP IP/OBS MODERATE 35: CPT

## 2020-12-02 RX ORDER — DESVENLAFAXINE 50 MG/1
75 TABLET, EXTENDED RELEASE ORAL AT BEDTIME
Refills: 0 | Status: DISCONTINUED | OUTPATIENT
Start: 2020-12-02 | End: 2020-12-04

## 2020-12-02 RX ADMIN — DESVENLAFAXINE 75 MILLIGRAM(S): 50 TABLET, EXTENDED RELEASE ORAL at 20:44

## 2020-12-02 RX ADMIN — SENNA PLUS 2 TABLET(S): 8.6 TABLET ORAL at 20:44

## 2020-12-02 RX ADMIN — Medication 150 MILLIGRAM(S): at 20:44

## 2020-12-02 RX ADMIN — ARIPIPRAZOLE 15 MILLIGRAM(S): 15 TABLET ORAL at 20:44

## 2020-12-02 RX ADMIN — PALIPERIDONE 3 MILLIGRAM(S): 1.5 TABLET, EXTENDED RELEASE ORAL at 20:44

## 2020-12-02 RX ADMIN — CLOZAPINE 500 MILLIGRAM(S): 150 TABLET, ORALLY DISINTEGRATING ORAL at 20:44

## 2020-12-02 RX ADMIN — POLYETHYLENE GLYCOL 3350 17 GRAM(S): 17 POWDER, FOR SOLUTION ORAL at 08:43

## 2020-12-02 RX ADMIN — Medication 25 MILLIGRAM(S): at 14:43

## 2020-12-02 RX ADMIN — DIVALPROEX SODIUM 2000 MILLIGRAM(S): 500 TABLET, DELAYED RELEASE ORAL at 20:44

## 2020-12-03 PROBLEM — F60.3 BORDERLINE PERSONALITY DISORDER: Chronic | Status: ACTIVE | Noted: 2020-11-03

## 2020-12-03 LAB — CLOZAPINE SERPL-MCNC: SIGNIFICANT CHANGE UP

## 2020-12-03 PROCEDURE — 99233 SBSQ HOSP IP/OBS HIGH 50: CPT

## 2020-12-03 PROCEDURE — 90832 PSYTX W PT 30 MINUTES: CPT

## 2020-12-03 RX ORDER — DIVALPROEX SODIUM 500 MG/1
4 TABLET, DELAYED RELEASE ORAL
Qty: 120 | Refills: 0
Start: 2020-12-03 | End: 2021-01-01

## 2020-12-03 RX ORDER — METOPROLOL TARTRATE 50 MG
3 TABLET ORAL
Qty: 90 | Refills: 0
Start: 2020-12-03 | End: 2021-01-01

## 2020-12-03 RX ORDER — CLOZAPINE 150 MG/1
2.5 TABLET, ORALLY DISINTEGRATING ORAL
Qty: 75 | Refills: 0
Start: 2020-12-03 | End: 2021-01-01

## 2020-12-03 RX ORDER — DESVENLAFAXINE 50 MG/1
3 TABLET, EXTENDED RELEASE ORAL
Qty: 90 | Refills: 0
Start: 2020-12-03 | End: 2021-01-01

## 2020-12-03 RX ORDER — PERPHENAZINE 8 MG/1
1 TABLET, FILM COATED ORAL
Qty: 0 | Refills: 0 | DISCHARGE

## 2020-12-03 RX ORDER — ARIPIPRAZOLE 15 MG/1
1 TABLET ORAL
Qty: 30 | Refills: 0
Start: 2020-12-03 | End: 2021-01-01

## 2020-12-03 RX ORDER — MIRTAZAPINE 45 MG/1
1 TABLET, ORALLY DISINTEGRATING ORAL
Qty: 0 | Refills: 0 | DISCHARGE

## 2020-12-03 RX ADMIN — PALIPERIDONE 3 MILLIGRAM(S): 1.5 TABLET, EXTENDED RELEASE ORAL at 20:57

## 2020-12-03 RX ADMIN — DIVALPROEX SODIUM 2000 MILLIGRAM(S): 500 TABLET, DELAYED RELEASE ORAL at 20:57

## 2020-12-03 RX ADMIN — DESVENLAFAXINE 75 MILLIGRAM(S): 50 TABLET, EXTENDED RELEASE ORAL at 20:57

## 2020-12-03 RX ADMIN — Medication 150 MILLIGRAM(S): at 20:57

## 2020-12-03 RX ADMIN — SENNA PLUS 2 TABLET(S): 8.6 TABLET ORAL at 20:57

## 2020-12-03 RX ADMIN — POLYETHYLENE GLYCOL 3350 17 GRAM(S): 17 POWDER, FOR SOLUTION ORAL at 08:47

## 2020-12-03 RX ADMIN — ARIPIPRAZOLE 15 MILLIGRAM(S): 15 TABLET ORAL at 20:57

## 2020-12-03 RX ADMIN — CLOZAPINE 500 MILLIGRAM(S): 150 TABLET, ORALLY DISINTEGRATING ORAL at 20:57

## 2020-12-04 VITALS — TEMPERATURE: 98 F

## 2020-12-04 LAB
BASOPHILS # BLD AUTO: 0.02 K/UL — SIGNIFICANT CHANGE UP (ref 0–0.2)
BASOPHILS NFR BLD AUTO: 0.3 % — SIGNIFICANT CHANGE UP (ref 0–2)
EOSINOPHIL # BLD AUTO: 0.02 K/UL — SIGNIFICANT CHANGE UP (ref 0–0.5)
EOSINOPHIL NFR BLD AUTO: 0.3 % — SIGNIFICANT CHANGE UP (ref 0–6)
HCT VFR BLD CALC: 46.7 % — SIGNIFICANT CHANGE UP (ref 39–50)
HGB BLD-MCNC: 14.7 G/DL — SIGNIFICANT CHANGE UP (ref 13–17)
IMM GRANULOCYTES NFR BLD AUTO: 0.6 % — SIGNIFICANT CHANGE UP (ref 0–1.5)
LYMPHOCYTES # BLD AUTO: 3.37 K/UL — HIGH (ref 1–3.3)
LYMPHOCYTES # BLD AUTO: 43.3 % — SIGNIFICANT CHANGE UP (ref 13–44)
MCHC RBC-ENTMCNC: 26 PG — LOW (ref 27–34)
MCHC RBC-ENTMCNC: 31.5 % — LOW (ref 32–36)
MCV RBC AUTO: 82.7 FL — SIGNIFICANT CHANGE UP (ref 80–100)
MONOCYTES # BLD AUTO: 0.68 K/UL — SIGNIFICANT CHANGE UP (ref 0–0.9)
MONOCYTES NFR BLD AUTO: 8.7 % — SIGNIFICANT CHANGE UP (ref 2–14)
NEUTROPHILS # BLD AUTO: 3.65 K/UL — SIGNIFICANT CHANGE UP (ref 1.8–7.4)
NEUTROPHILS NFR BLD AUTO: 46.8 % — SIGNIFICANT CHANGE UP (ref 43–77)
NRBC # FLD: 0 K/UL — SIGNIFICANT CHANGE UP (ref 0–0)
PLATELET # BLD AUTO: 211 K/UL — SIGNIFICANT CHANGE UP (ref 150–400)
PMV BLD: 11.1 FL — SIGNIFICANT CHANGE UP (ref 7–13)
RBC # BLD: 5.65 M/UL — SIGNIFICANT CHANGE UP (ref 4.2–5.8)
RBC # FLD: 15 % — HIGH (ref 10.3–14.5)
WBC # BLD: 7.79 K/UL — SIGNIFICANT CHANGE UP (ref 3.8–10.5)
WBC # FLD AUTO: 7.79 K/UL — SIGNIFICANT CHANGE UP (ref 3.8–10.5)

## 2020-12-04 PROCEDURE — 90832 PSYTX W PT 30 MINUTES: CPT

## 2020-12-04 RX ORDER — CLOZAPINE 150 MG/1
2.5 TABLET, ORALLY DISINTEGRATING ORAL
Qty: 75 | Refills: 0
Start: 2020-12-04 | End: 2021-01-02

## 2020-12-04 RX ORDER — DESVENLAFAXINE 50 MG/1
3 TABLET, EXTENDED RELEASE ORAL
Qty: 90 | Refills: 0
Start: 2020-12-04 | End: 2021-01-05

## 2020-12-04 RX ORDER — DESVENLAFAXINE 50 MG/1
3 TABLET, EXTENDED RELEASE ORAL
Qty: 90 | Refills: 0
Start: 2020-12-04 | End: 2021-01-02

## 2020-12-04 RX ORDER — METOPROLOL TARTRATE 50 MG
3 TABLET ORAL
Qty: 90 | Refills: 0
Start: 2020-12-04 | End: 2021-01-02

## 2020-12-04 RX ORDER — DIVALPROEX SODIUM 500 MG/1
4 TABLET, DELAYED RELEASE ORAL
Qty: 120 | Refills: 0
Start: 2020-12-04 | End: 2021-01-02

## 2020-12-04 RX ORDER — ARIPIPRAZOLE 15 MG/1
1 TABLET ORAL
Qty: 30 | Refills: 0
Start: 2020-12-04 | End: 2021-01-02

## 2021-02-22 NOTE — BEHAVIORAL HEALTH ASSESSMENT NOTE - RISK ASSESSMENT
My signature below certifies that the above stated patient is homebound and upon completion of the Face-To-Face encounter, has the need for intermittent skilled nursing, physical therapy and/or speech or occupational therapy services in their home for their current diagnosis as outlined in their initial plan of care. These services will continue to be monitored by myself or another physician. Pt has chronic risk factors including SI and impaired reality testing. These risks are mediated by compliance with psychiatric follow up and medications.

## 2021-06-01 ENCOUNTER — OUTPATIENT (OUTPATIENT)
Dept: OUTPATIENT SERVICES | Facility: HOSPITAL | Age: 31
LOS: 1 days | End: 2021-06-01
Payer: COMMERCIAL

## 2021-06-01 PROCEDURE — H0002: CPT

## 2021-08-16 DIAGNOSIS — Z71.89 OTHER SPECIFIED COUNSELING: ICD-10-CM

## 2021-12-01 PROCEDURE — G9005: CPT

## 2023-02-08 RX ORDER — DESVENLAFAXINE 50 MG/1
0 TABLET, EXTENDED RELEASE ORAL
Qty: 30 | Refills: 3
Start: 2023-02-08 | End: 2023-06-07

## 2023-03-30 ENCOUNTER — APPOINTMENT (OUTPATIENT)
Dept: UROLOGY | Facility: CLINIC | Age: 33
End: 2023-03-30
Payer: MEDICAID

## 2023-03-30 VITALS
BODY MASS INDEX: 26.66 KG/M2 | WEIGHT: 180 LBS | SYSTOLIC BLOOD PRESSURE: 115 MMHG | TEMPERATURE: 98 F | DIASTOLIC BLOOD PRESSURE: 82 MMHG | HEIGHT: 69 IN | HEART RATE: 96 BPM

## 2023-03-30 DIAGNOSIS — Z80.3 FAMILY HISTORY OF MALIGNANT NEOPLASM OF BREAST: ICD-10-CM

## 2023-03-30 PROCEDURE — 99203 OFFICE O/P NEW LOW 30 MIN: CPT

## 2023-03-30 RX ORDER — DESVENLAFAXINE SUCCINATE 25 MG/1
TABLET, EXTENDED RELEASE ORAL
Refills: 0 | Status: ACTIVE | COMMUNITY

## 2023-04-03 LAB
APPEARANCE: CLEAR
BACTERIA UR CULT: NORMAL
BACTERIA: NEGATIVE
BILIRUBIN URINE: NEGATIVE
BLOOD URINE: ABNORMAL
COLOR: YELLOW
GLUCOSE QUALITATIVE U: NEGATIVE
HYALINE CASTS: 1 /LPF
KETONES URINE: NEGATIVE
LEUKOCYTE ESTERASE URINE: NEGATIVE
MICROSCOPIC-UA: NORMAL
NITRITE URINE: NEGATIVE
PH URINE: 6.5
PROTEIN URINE: ABNORMAL
RED BLOOD CELLS URINE: 139 /HPF
SPECIFIC GRAVITY URINE: 1.02
SQUAMOUS EPITHELIAL CELLS: 0 /HPF
UROBILINOGEN URINE: NORMAL
WHITE BLOOD CELLS URINE: 2 /HPF

## 2023-04-03 NOTE — ASSESSMENT
[FreeTextEntry1] : Urinalysis was obtained today and does show the presence of 139 red blood cells per high-powered field.  He also has a small amount of protein in the urine.  \par We discussed the wide range of conditions potentially associated with microscopic hematuria including both benign and malignant etiologies.  I have explained to the patient that inflammatory or infectious etiologies can produce hematuria such as urinary tract infection.  We also reviewed the fact that malignancy within the urinary tract can also produce hematuria.  Obstructive etiologies have also been reviewed with the patient.  I have explained that evaluation to help determine the etiology of the hematuria would involve upper tract imaging and cystoscopy to assess.  Depending on findings, additional work-up could also be necessary.\par \par I will arrange for imaging to be done with a CT urogram.  I may also consider a cystoscopy here given the degree of hematuria to confirm the absence of any malignancy within the bladder.  I explained the reasons for these work-ups as outlined and the patient communicates his understanding.  He is in agreement to have the tests done.

## 2023-04-03 NOTE — HISTORY OF PRESENT ILLNESS
[FreeTextEntry1] : Tae Elliott presents to the office today.  He is a 33-year-old man who is here today with a primary complaint of reported microscopic hematuria that was detected on a routine physical exam.  The patient does not have any laboratory results showing this finding with him for review.  The patient has no history of gross hematuria.  He has no recent history of any urinary symptoms including urgency, frequency, or dysuria.  He reports a normal urinary flow without hesitancy or sensation of incomplete bladder emptying.  There is no history of kidney stones or urinary tract infections.  No history of bladder catheterization or urethral trauma.\par

## 2023-04-03 NOTE — PHYSICAL EXAM
[General Appearance - Well Developed] : well developed [General Appearance - Well Nourished] : well nourished [Normal Appearance] : normal appearance [Well Groomed] : well groomed [General Appearance - In No Acute Distress] : no acute distress [Edema] : no peripheral edema [Respiration, Rhythm And Depth] : normal respiratory rhythm and effort [Exaggerated Use Of Accessory Muscles For Inspiration] : no accessory muscle use [Abdomen Soft] : soft [Abdomen Tenderness] : non-tender [Costovertebral Angle Tenderness] : no ~M costovertebral angle tenderness [Urinary Bladder Findings] : the bladder was normal on palpation [Urethral Meatus] : meatus normal [Testes Mass (___cm)] : there were no testicular masses [Scrotum] : the scrotum was normal [Normal Station and Gait] : the gait and station were normal for the patient's age [] : no rash [No Focal Deficits] : no focal deficits [Oriented To Time, Place, And Person] : oriented to person, place, and time [Affect] : the affect was normal [Mood] : the mood was normal [Not Anxious] : not anxious [No Palpable Adenopathy] : no palpable adenopathy

## 2023-04-06 ENCOUNTER — RESULT REVIEW (OUTPATIENT)
Age: 33
End: 2023-04-06

## 2023-04-08 ENCOUNTER — OUTPATIENT (OUTPATIENT)
Dept: OUTPATIENT SERVICES | Facility: HOSPITAL | Age: 33
LOS: 1 days | End: 2023-04-08
Payer: COMMERCIAL

## 2023-04-08 ENCOUNTER — APPOINTMENT (OUTPATIENT)
Dept: CT IMAGING | Facility: IMAGING CENTER | Age: 33
End: 2023-04-08
Payer: MEDICAID

## 2023-04-08 DIAGNOSIS — R31.29 OTHER MICROSCOPIC HEMATURIA: ICD-10-CM

## 2023-04-08 PROCEDURE — 74178 CT ABD&PLV WO CNTR FLWD CNTR: CPT | Mod: 26

## 2023-04-08 PROCEDURE — 74178 CT ABD&PLV WO CNTR FLWD CNTR: CPT

## 2023-05-04 ENCOUNTER — OUTPATIENT (OUTPATIENT)
Dept: OUTPATIENT SERVICES | Facility: HOSPITAL | Age: 33
LOS: 1 days | End: 2023-05-04
Payer: COMMERCIAL

## 2023-05-04 ENCOUNTER — APPOINTMENT (OUTPATIENT)
Dept: UROLOGY | Facility: CLINIC | Age: 33
End: 2023-05-04
Payer: MEDICAID

## 2023-05-04 VITALS — HEART RATE: 103 BPM | OXYGEN SATURATION: 99 % | SYSTOLIC BLOOD PRESSURE: 133 MMHG | DIASTOLIC BLOOD PRESSURE: 94 MMHG

## 2023-05-04 DIAGNOSIS — R35.0 FREQUENCY OF MICTURITION: ICD-10-CM

## 2023-05-04 DIAGNOSIS — Q27.39 ARTERIOVENOUS MALFORMATION, OTHER SITE: ICD-10-CM

## 2023-05-04 PROCEDURE — 52000 CYSTOURETHROSCOPY: CPT

## 2023-05-04 PROCEDURE — 99214 OFFICE O/P EST MOD 30 MIN: CPT | Mod: 25

## 2023-05-07 PROBLEM — Q27.39 ARTERIOVENOUS MALFORMATION, OTHER SITE: Status: ACTIVE | Noted: 2023-05-07

## 2023-05-07 NOTE — HISTORY OF PRESENT ILLNESS
[FreeTextEntry1] : Tae Elliott returns to the office today.  He is 33 years old with history of schizoaffective disorder.  On routine exam he had been found to have microscopic hematuria of significance with 139 red blood cells per high-powered field.  There was no reported history of gross hematuria.  No history of any urethral trauma or insertion of any objects per urethra.  No history of urinary tract infections or sexually transmitted infections.  I have recommended that he undergo CT urogram evaluation which she had done prior to today's visit.  The CT urogram showed no evidence of any pathology within the genitourinary tract including any urinary tract stones or hydronephrosis.  No evidence of obstruction.  There was no evidence of any urothelial enhancement or other lesion.\par \par Because of the significance of the microscopic hematuria I have recommended he undergo a cystoscopy as additional evaluation.  He is here today for that follow-up.  He reports again no visible hematuria.  No dysuria.  He is asymptomatic from a urinary standpoint at this time.

## 2023-05-07 NOTE — ASSESSMENT
[FreeTextEntry1] : We reviewed the CT scan findings together today so that he had a good understanding of both the reason for the test being performed previously as part of the evaluation for the hematuria and also the fact that there was no evidence of any concerning features.  I also performed a cystoscopy today as planned.  I explained the procedure very precisely to the patient today before we began.  The patient had asked me if I would be "invading his privacy with this procedure."  I told him that I would would be inserting the cystoscope into the bladder as part of the evaluation for the hematuria and I did obtain his consent for the procedure before proceeding. Brittany Neal medical assistant was present during the procedure.\par \par Cystoscopy findings showed no evidence of any tumor features to suggest the presence of malignancy.  There were a few very discrete small erythematous lesions that I believe are most likely consistent with arteriovenous malformations.  There was no evidence of any active bleeding from any of the sites although I do believe these could be associated with the microscopic hematuria given that they were the only abnormality detected.  Otherwise the bladder mucosa was normal throughout.  No evidence of stones, diverticuli, or other anatomical issues.  Both ureters appeared in their normal location without any visible abnormalities.\par \par I discussed the findings with the patient.  I also discussed them with his mother by his request explaining the reasons for the evaluation and the findings.  I do not believe he needs to have a planned follow-up at this point but I would be happy to see him back on an as-needed basis.  I did recommend that he see a nephrologist given the findings of the microscopic hematuria without clear urologic cause.

## 2023-05-07 NOTE — PHYSICAL EXAM
[General Appearance - Well Developed] : well developed [Normal Appearance] : normal appearance [General Appearance - Well Nourished] : well nourished [Well Groomed] : well groomed [General Appearance - In No Acute Distress] : no acute distress [Abdomen Soft] : soft [Abdomen Tenderness] : non-tender [Costovertebral Angle Tenderness] : no ~M costovertebral angle tenderness [Urethral Meatus] : meatus normal [Scrotum] : the scrotum was normal [Urinary Bladder Findings] : the bladder was normal on palpation [Testes Mass (___cm)] : there were no testicular masses [Edema] : no peripheral edema [] : no respiratory distress [Respiration, Rhythm And Depth] : normal respiratory rhythm and effort [Exaggerated Use Of Accessory Muscles For Inspiration] : no accessory muscle use [Oriented To Time, Place, And Person] : oriented to person, place, and time [Affect] : the affect was normal [Mood] : the mood was normal [Not Anxious] : not anxious [Normal Station and Gait] : the gait and station were normal for the patient's age [No Focal Deficits] : no focal deficits [No Palpable Adenopathy] : no palpable adenopathy

## 2023-05-08 DIAGNOSIS — R31.29 OTHER MICROSCOPIC HEMATURIA: ICD-10-CM

## 2023-05-08 DIAGNOSIS — Q27.39 ARTERIOVENOUS MALFORMATION, OTHER SITE: ICD-10-CM

## 2023-05-30 ENCOUNTER — LABORATORY RESULT (OUTPATIENT)
Age: 33
End: 2023-05-30

## 2023-05-30 ENCOUNTER — APPOINTMENT (OUTPATIENT)
Dept: NEPHROLOGY | Facility: CLINIC | Age: 33
End: 2023-05-30
Payer: MEDICAID

## 2023-05-30 VITALS — SYSTOLIC BLOOD PRESSURE: 122 MMHG | HEART RATE: 123 BPM | DIASTOLIC BLOOD PRESSURE: 93 MMHG

## 2023-05-30 DIAGNOSIS — R31.29 OTHER MICROSCOPIC HEMATURIA: ICD-10-CM

## 2023-05-30 PROCEDURE — 99204 OFFICE O/P NEW MOD 45 MIN: CPT

## 2023-05-30 NOTE — PHYSICAL EXAM
[General Appearance - Alert] : alert [General Appearance - In No Acute Distress] : in no acute distress [Sclera] : the sclera and conjunctiva were normal [PERRL With Normal Accommodation] : pupils were equal in size, round, and reactive to light [Extraocular Movements] : extraocular movements were intact [Outer Ear] : the ears and nose were normal in appearance [Oropharynx] : the oropharynx was normal [Neck Appearance] : the appearance of the neck was normal [Neck Cervical Mass (___cm)] : no neck mass was observed [Jugular Venous Distention Increased] : there was no jugular-venous distention [Thyroid Diffuse Enlargement] : the thyroid was not enlarged [Thyroid Nodule] : there were no palpable thyroid nodules [Auscultation Breath Sounds / Voice Sounds] : lungs were clear to auscultation bilaterally [Heart Rate And Rhythm] : heart rate was normal and rhythm regular [Heart Sounds] : normal S1 and S2 [Heart Sounds Gallop] : no gallops [Murmurs] : no murmurs [Heart Sounds Pericardial Friction Rub] : no pericardial rub [Full Pulse] : the pedal pulses are present [Edema] : there was no peripheral edema [Bowel Sounds] : normal bowel sounds [Abdomen Soft] : soft [Abdomen Tenderness] : non-tender [Abdomen Mass (___ Cm)] : no abdominal mass palpated [Cervical Lymph Nodes Enlarged Posterior Bilaterally] : posterior cervical [Cervical Lymph Nodes Enlarged Anterior Bilaterally] : anterior cervical [Supraclavicular Lymph Nodes Enlarged Bilaterally] : supraclavicular [No CVA Tenderness] : no ~M costovertebral angle tenderness [No Spinal Tenderness] : no spinal tenderness [Abnormal Walk] : normal gait [Nail Clubbing] : no clubbing  or cyanosis of the fingernails [Musculoskeletal - Swelling] : no joint swelling seen [Motor Tone] : muscle strength and tone were normal [Skin Color & Pigmentation] : normal skin color and pigmentation [Skin Turgor] : normal skin turgor [] : no rash [Deep Tendon Reflexes (DTR)] : deep tendon reflexes were 2+ and symmetric [Sensation] : the sensory exam was normal to light touch and pinprick [No Focal Deficits] : no focal deficits [Oriented To Time, Place, And Person] : oriented to person, place, and time

## 2023-06-12 LAB
25(OH)D3 SERPL-MCNC: 56.5 NG/ML
ALBUMIN MFR SERPL ELPH: 56.4 %
ALBUMIN SERPL ELPH-MCNC: 4.1 G/DL
ALBUMIN SERPL-MCNC: 4 G/DL
ALBUMIN/GLOB SERPL: 1.3 RATIO
ALP BLD-CCNC: 85 U/L
ALPHA1 GLOB MFR SERPL ELPH: 4.8 %
ALPHA1 GLOB SERPL ELPH-MCNC: 0.3 G/DL
ALPHA2 GLOB MFR SERPL ELPH: 7.8 %
ALPHA2 GLOB SERPL ELPH-MCNC: 0.6 G/DL
ALT SERPL-CCNC: 66 U/L
ANA SER IF-ACNC: NEGATIVE
ANION GAP SERPL CALC-SCNC: 12 MMOL/L
APPEARANCE: ABNORMAL
AST SERPL-CCNC: 32 U/L
B-GLOBULIN MFR SERPL ELPH: 11.9 %
B-GLOBULIN SERPL ELPH-MCNC: 0.8 G/DL
BACTERIA: NEGATIVE /HPF
BILIRUB SERPL-MCNC: <0.2 MG/DL
BILIRUBIN URINE: NEGATIVE
BLOOD URINE: NEGATIVE
BUN SERPL-MCNC: 12 MG/DL
C3 SERPL-MCNC: 160 MG/DL
C4 SERPL-MCNC: 42 MG/DL
CALCIUM OXALATE CRYSTALS: PRESENT
CALCIUM SERPL-MCNC: 9.9 MG/DL
CALCIUM SERPL-MCNC: 9.9 MG/DL
CAST: 0 /LPF
CHLORIDE SERPL-SCNC: 107 MMOL/L
CO2 SERPL-SCNC: 28 MMOL/L
COLOR: YELLOW
CREAT SERPL-MCNC: 1.06 MG/DL
CREAT SPEC-SCNC: 307 MG/DL
CREAT/PROT UR: 0 RATIO
DEPRECATED KAPPA LC FREE/LAMBDA SER: 1.52 RATIO
EGFR: 95 ML/MIN/1.73M2
EPITHELIAL CELLS: 0 /HPF
ERYTHROCYTE [SEDIMENTATION RATE] IN BLOOD BY WESTERGREN METHOD: 7 MM/HR
FREE KAPPA URINE: 30.12 MG/L
FREE KAPPA/LAMDA RATIO: 12.98 RATIO
FREE LAMDA URINE: 2.32 MG/L
GAMMA GLOB FLD ELPH-MCNC: 1.4 G/DL
GAMMA GLOB MFR SERPL ELPH: 19.1 %
GLUCOSE QUALITATIVE U: NEGATIVE MG/DL
GLUCOSE SERPL-MCNC: 85 MG/DL
HAV IGM SER QL: NONREACTIVE
HBV CORE IGM SER QL: NONREACTIVE
HBV SURFACE AG SER QL: NONREACTIVE
HCV AB SER QL: NONREACTIVE
HCV S/CO RATIO: 0.22 S/CO
HGB A MFR BLD: 97.2 %
HGB A2 MFR BLD: 2.8 %
HGB FRACT BLD-IMP: NORMAL
HIV1+2 AB SPEC QL IA.RAPID: NONREACTIVE
IGA SER QL IEP: 135 MG/DL
IGG SER QL IEP: 1436 MG/DL
IGM SER QL IEP: 91 MG/DL
INR PPP: 0.97 RATIO
INTERPRETATION SERPL IEP-IMP: NORMAL
KAPPA LC 24H UR QL: NORMAL
KAPPA LC CSF-MCNC: 1.17 MG/DL
KAPPA LC SERPL-MCNC: 1.78 MG/DL
KETONES URINE: ABNORMAL MG/DL
LEUKOCYTE ESTERASE URINE: NEGATIVE
M PROTEIN SPEC IFE-MCNC: NORMAL
MICROSCOPIC-UA: NORMAL
MUCUS: PRESENT
NITRITE URINE: NEGATIVE
PARATHYROID HORMONE INTACT: 25 PG/ML
PH URINE: 8
PHOSPHOLIPASE A2 RECEPTOR ELISA: <1.8 RU/ML
POTASSIUM SERPL-SCNC: 4.1 MMOL/L
PROT SERPL-MCNC: 7.1 G/DL
PROT UR-MCNC: 13 MG/DL
PROTEIN URINE: 30 MG/DL
PROTEINASE3 AB SER IA-ACNC: <5 UNITS
PROTEINASE3 AB SER-ACNC: NEGATIVE
PT BLD: 11.3 SEC
RED BLOOD CELLS URINE: 4 /HPF
REVIEW: NORMAL
SODIUM SERPL-SCNC: 146 MMOL/L
SPECIFIC GRAVITY URINE: 1.02
URATE SERPL-MCNC: 5 MG/DL
UROBILINOGEN URINE: 1 MG/DL
WHITE BLOOD CELLS URINE: 0 /HPF

## 2023-12-21 ENCOUNTER — NON-APPOINTMENT (OUTPATIENT)
Age: 33
End: 2023-12-21

## 2024-09-09 PROCEDURE — 90833 PSYTX W PT W E/M 30 MIN: CPT | Mod: 93

## 2024-09-09 PROCEDURE — 99214 OFFICE O/P EST MOD 30 MIN: CPT | Mod: 95

## 2024-10-07 PROCEDURE — 99214 OFFICE O/P EST MOD 30 MIN: CPT | Mod: 95

## 2024-10-07 PROCEDURE — 90833 PSYTX W PT W E/M 30 MIN: CPT | Mod: 93

## 2024-11-04 PROCEDURE — 99214 OFFICE O/P EST MOD 30 MIN: CPT | Mod: 95

## 2025-01-13 PROCEDURE — 90833 PSYTX W PT W E/M 30 MIN: CPT | Mod: 93

## 2025-01-13 PROCEDURE — 99214 OFFICE O/P EST MOD 30 MIN: CPT | Mod: 95

## 2025-03-24 NOTE — ED PROVIDER NOTE - CPE EDP MUSC NORM
Photo Preface (Leave Blank If You Do Not Want): Photographs were obtained today Detail Level: Zone normal...